# Patient Record
Sex: FEMALE | Race: WHITE | NOT HISPANIC OR LATINO | Employment: FULL TIME | URBAN - METROPOLITAN AREA
[De-identification: names, ages, dates, MRNs, and addresses within clinical notes are randomized per-mention and may not be internally consistent; named-entity substitution may affect disease eponyms.]

---

## 2017-12-15 ENCOUNTER — HOSPITAL ENCOUNTER (OUTPATIENT)
Dept: FAMILY MEDICINE CLINIC | Facility: CLINIC | Age: 52
Setting detail: SPECIMEN
Discharge: HOME | End: 2017-12-15
Attending: FAMILY MEDICINE | Admitting: FAMILY MEDICINE

## 2017-12-15 LAB
ALBUMIN SERPL-MCNC: 4.1 G/DL (ref 3.5–4.8)
ALBUMIN/GLOB SERPL: 1.5 {RATIO} (ref 1–1.7)
ALP SERPL-CCNC: 56 IU/L (ref 32–91)
ALT SERPL-CCNC: 41 IU/L (ref 14–54)
ANION GAP SERPL CALC-SCNC: 9.7 MMOL/L (ref 10–20)
AST SERPL-CCNC: 25 IU/L (ref 15–41)
BILIRUB SERPL-MCNC: 0.6 MG/DL (ref 0.3–1.2)
BUN SERPL-MCNC: 10 MG/DL (ref 8–20)
BUN/CREAT SERPL: 14.3 (ref 5.4–26.2)
CALCIUM SERPL-MCNC: 9.5 MG/DL (ref 8.9–10.3)
CHLORIDE SERPL-SCNC: 103 MMOL/L (ref 101–111)
CHOLEST SERPL-MCNC: 248 MG/DL
CHOLEST/HDLC SERPL: 6.9 {RATIO}
CONV CO2: 29 MMOL/L (ref 22–32)
CONV LDL CHOLESTEROL DIRECT: 156 MG/DL (ref 0–100)
CONV TOTAL PROTEIN: 6.9 G/DL (ref 6.1–7.9)
CREAT UR-MCNC: 0.7 MG/DL (ref 0.4–1)
GLOBULIN UR ELPH-MCNC: 2.8 G/DL (ref 2.5–3.8)
GLUCOSE SERPL-MCNC: 93 MG/DL (ref 65–99)
HDLC SERPL-MCNC: 36 MG/DL
LDLC/HDLC SERPL: 4.3 {RATIO}
LIPID INTERPRETATION: ABNORMAL
POTASSIUM SERPL-SCNC: 4.7 MMOL/L (ref 3.6–5.1)
SODIUM SERPL-SCNC: 137 MMOL/L (ref 136–144)
TRIGL SERPL-MCNC: 272 MG/DL
TSH SERPL-ACNC: 0.64 UIU/ML (ref 0.34–5.6)
VLDLC SERPL CALC-MCNC: 56 MG/DL

## 2019-02-21 ENCOUNTER — HOSPITAL ENCOUNTER (OUTPATIENT)
Dept: MAMMOGRAPHY | Facility: HOSPITAL | Age: 54
Discharge: HOME | End: 2019-02-21
Attending: FAMILY MEDICINE | Admitting: FAMILY MEDICINE

## 2019-03-20 ENCOUNTER — HOSPITAL ENCOUNTER (OUTPATIENT)
Dept: FAMILY MEDICINE CLINIC | Facility: CLINIC | Age: 54
Setting detail: SPECIMEN
Discharge: HOME | End: 2019-03-20
Attending: FAMILY MEDICINE | Admitting: FAMILY MEDICINE

## 2019-09-03 RX ORDER — ZOLPIDEM TARTRATE 5 MG/1
TABLET ORAL
Qty: 30 TABLET | Refills: 5 | Status: SHIPPED | OUTPATIENT
Start: 2019-09-03 | End: 2019-12-11 | Stop reason: DRUGHIGH

## 2019-11-11 ENCOUNTER — OFFICE VISIT (OUTPATIENT)
Dept: FAMILY MEDICINE CLINIC | Facility: CLINIC | Age: 54
End: 2019-11-11

## 2019-11-11 VITALS
SYSTOLIC BLOOD PRESSURE: 152 MMHG | DIASTOLIC BLOOD PRESSURE: 94 MMHG | HEART RATE: 95 BPM | HEIGHT: 65 IN | BODY MASS INDEX: 34.16 KG/M2 | OXYGEN SATURATION: 96 % | WEIGHT: 205 LBS

## 2019-11-11 DIAGNOSIS — G25.81 RESTLESS LEG: ICD-10-CM

## 2019-11-11 DIAGNOSIS — M25.512 ACUTE PAIN OF BOTH SHOULDERS: ICD-10-CM

## 2019-11-11 DIAGNOSIS — M25.511 ACUTE PAIN OF BOTH SHOULDERS: ICD-10-CM

## 2019-11-11 DIAGNOSIS — H92.22 EAR DISCHARGE BLOOD, LEFT: ICD-10-CM

## 2019-11-11 DIAGNOSIS — I10 ESSENTIAL HYPERTENSION: Primary | ICD-10-CM

## 2019-11-11 PROBLEM — Z01.89 OTHER SPECIFIED EXAMINATION: Status: ACTIVE | Noted: 2018-01-23

## 2019-11-11 PROBLEM — Z87.892 PERSONAL HISTORY OF ANAPHYLAXIS: Status: ACTIVE | Noted: 2019-02-18

## 2019-11-11 PROBLEM — E78.5 HYPERLIPIDEMIA: Status: ACTIVE | Noted: 2019-11-11

## 2019-11-11 PROBLEM — M67.90 DISORDER OF TENDON: Status: ACTIVE | Noted: 2019-04-05

## 2019-11-11 PROBLEM — F41.8 MIXED ANXIETY DEPRESSIVE DISORDER: Status: ACTIVE | Noted: 2017-12-15

## 2019-11-11 PROBLEM — F17.200 TOBACCO DEPENDENCE SYNDROME: Status: ACTIVE | Noted: 2017-12-15

## 2019-11-11 PROCEDURE — 99214 OFFICE O/P EST MOD 30 MIN: CPT | Performed by: NURSE PRACTITIONER

## 2019-11-11 RX ORDER — EPINEPHRINE 0.3 MG/.3ML
INJECTION SUBCUTANEOUS
COMMUNITY
Start: 2019-02-18 | End: 2020-04-28

## 2019-11-11 RX ORDER — MELOXICAM 15 MG/1
15 TABLET ORAL DAILY
COMMUNITY
End: 2020-03-11

## 2019-11-11 RX ORDER — LISINOPRIL 10 MG/1
10 TABLET ORAL DAILY
Qty: 30 TABLET | Refills: 0 | Status: SHIPPED | OUTPATIENT
Start: 2019-11-11 | End: 2019-12-03 | Stop reason: SDUPTHER

## 2019-11-11 RX ORDER — TIZANIDINE 2 MG/1
2 TABLET ORAL NIGHTLY PRN
Qty: 30 TABLET | Refills: 0 | Status: SHIPPED | OUTPATIENT
Start: 2019-11-11 | End: 2019-12-03 | Stop reason: SDUPTHER

## 2019-11-11 RX ORDER — CITALOPRAM 10 MG/1
10 TABLET ORAL DAILY
Refills: 3 | COMMUNITY
Start: 2019-10-30 | End: 2020-06-02

## 2019-11-11 NOTE — PATIENT INSTRUCTIONS
Start taking lisinopril daily for blood pressure  Decrease sodium intake  Continue taking mobic for joint pain  Muscle relaxer as needed at night  Physical therapy ordered  Work on exercises given in office  Bring copy of lab work this week   F/u 1 month

## 2019-11-11 NOTE — PROGRESS NOTES
Subjective   Elodia Alicea is a 54 y.o. female.     Pt is here today wit c/o HTN, ear drainage, shoulder pain, and restless leg.      HTN- Pt reports that her BP has been elevated on and off for years. Never has taken any medication.  Denies HA or chest pain.  Has SOA, but she smokes cigarettes.  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  She states that her cholesterol was high.  She cannot remember which other labs they orville.    Left ear drainage- Pt reports that she had some bloody drainage on Friday morning when she cleaned her ear with a qtip. She has not had any more drainage.  Denies any pain.  She has some hearing loss, but this is not new.  Had the nurse at work look at it and it was normal.     Shoulder pain- Bilateral shoulder pain for the last 3 months.  Has been taking mobic for inflammation, which helps some. She has also been using CBD cream, which helps some.  Denies any known injury.  She has increased her activity level and has has increased building projects this year.  She states that they ache and certain movements worsen her pain.  Raising her arms above her head it painful.  Pain is constant but the intensity changes with certain movements.     Leg shaking at night- Pt reports that she has been on ambien for sleeping.  She is only on 5 mg and has been trying to add supplement sleeping aides.  She has taken nyquil.  She states that her partner states that her legs are constantly moving during the night.  Her symptoms just started about 1 month ago. She is not waking frequently through the night or complaining of pain.  She has been using CBD oil at night for shoulder pain, which is also helping her sleep at night.         The following portions of the patient's history were reviewed and updated as appropriate: allergies, current medications, past family history, past medical history, past social history, past surgical history and  "problem list.    Review of Systems   Constitutional: Negative for chills, fatigue and fever.   HENT: Negative for congestion, ear pain and sinus pressure.    Eyes: Negative for blurred vision and double vision.   Respiratory: Negative for cough, chest tightness and shortness of breath.    Cardiovascular: Negative for chest pain, palpitations and leg swelling.   Musculoskeletal: Positive for arthralgias.   Neurological: Negative for dizziness and headache.       Objective   /94 (BP Location: Left arm, Patient Position: Sitting, Cuff Size: Large Adult)   Pulse 95   Ht 165.1 cm (65\")   Wt 93 kg (205 lb)   SpO2 96%   BMI 34.11 kg/m²   Physical Exam   Constitutional: She is oriented to person, place, and time. She appears well-developed and well-nourished. No distress.   HENT:   Head: Normocephalic and atraumatic.   Right Ear: Hearing, tympanic membrane, external ear and ear canal normal.   Left Ear: Hearing, tympanic membrane, external ear and ear canal normal.   Eyes: Conjunctivae and EOM are normal. Pupils are equal, round, and reactive to light. Right eye exhibits no discharge. Left eye exhibits no discharge.   Neck: Normal range of motion. Neck supple.   Cardiovascular: Normal rate and regular rhythm.   Pulmonary/Chest: Effort normal and breath sounds normal. No respiratory distress. She has no wheezes. She has no rales.   Abdominal: Soft. Normal appearance and bowel sounds are normal. She exhibits no distension. There is no tenderness.   Musculoskeletal: Normal range of motion.   Normal ROM with some mild discomfort in oscar shoulders.  No creputis or bony abnormalities   Neurological: She is alert and oriented to person, place, and time.   Skin: Skin is warm and dry. She is not diaphoretic.   Psychiatric: She has a normal mood and affect. Her behavior is normal. Thought content normal.   Vitals reviewed.        Assessment/Plan     Diagnoses and all orders for this visit:    1. Essential hypertension " (Primary)  Comments:  Start lisinopril 10 mg  Follow-up 1 month  Decrease sodium intake  Orders:  -     lisinopril (PRINIVIL,ZESTRIL) 10 MG tablet; Take 1 tablet by mouth Daily.  Dispense: 30 tablet; Refill: 0    2. Acute pain of both shoulders  Comments:  Possible arthritis versus rotator cuff injury  Exercises given  Mobic ordered  PT ordered  Orders:  -     Ambulatory Referral to Physical Therapy Evaluate and treat  -     tiZANidine (ZANAFLEX) 2 MG tablet; Take 1 tablet by mouth At Night As Needed for Muscle Spasms.  Dispense: 30 tablet; Refill: 0    3. Ear discharge blood, left  Comments:  No abnormalities noted  Resolved    4. Restless leg  Comments:  Could be side effect of medication  No pain  If no improvement will need a sleep study

## 2019-11-19 ENCOUNTER — TELEPHONE (OUTPATIENT)
Dept: FAMILY MEDICINE CLINIC | Facility: CLINIC | Age: 54
End: 2019-11-19

## 2019-11-19 RX ORDER — ATORVASTATIN CALCIUM 20 MG/1
20 TABLET, FILM COATED ORAL DAILY
Qty: 30 TABLET | Refills: 2 | Status: SHIPPED | OUTPATIENT
Start: 2019-11-19 | End: 2020-03-18 | Stop reason: SDUPTHER

## 2019-11-19 NOTE — TELEPHONE ENCOUNTER
Pt brought in labs that she had completed at her work wellness fair.  A1C 5.3 which is normal range. Glucose is 102.   Total cholesterol- 258  LDL- 185  HDL- 38  Triglycerides- 257    Her LDL and total cholesterol are quite elevated.  We need to start a statin on her. I will submit 20mg lipitor.  Increase exercise and work on eating a well-balanced diet.  Limit white foods (pasta, bread, rice, potatoes), saturated fats, sugary drinks, and dark meats.      ASCVD 10 year risk 16.2%

## 2019-11-20 ENCOUNTER — HOSPITAL ENCOUNTER (OUTPATIENT)
Dept: PHYSICAL THERAPY | Facility: HOSPITAL | Age: 54
Setting detail: THERAPIES SERIES
Discharge: HOME | End: 2019-11-20

## 2019-11-20 DIAGNOSIS — M25.511 BILATERAL SHOULDER PAIN, UNSPECIFIED CHRONICITY: Primary | ICD-10-CM

## 2019-11-20 DIAGNOSIS — M25.512 BILATERAL SHOULDER PAIN, UNSPECIFIED CHRONICITY: Primary | ICD-10-CM

## 2019-11-20 PROCEDURE — 97162 PT EVAL MOD COMPLEX 30 MIN: CPT | Performed by: PHYSICAL THERAPIST

## 2019-11-21 NOTE — THERAPY EVALUATION
"    Outpatient Physical Therapy Ortho Initial Evaluation  [facility]     Patient Name: Elodia Alicea  : 1965  MRN: [id number]  Today's Date: 2019      Visit Date: 2019    Patient Active Problem List   Diagnosis   • Disorder of tendon   • Other specified examination   • Encounter for screening for malignant neoplasm of cervix   • Encounter for general adult medical examination without abnormal findings   • Encounter for screening for malignant neoplasm of colon   • Hearing loss   • Hyperlipidemia   • Hypertension   • Hyperthyroidism   • Mixed anxiety depressive disorder   • Obesity   • Other screening mammogram   • Tobacco dependence syndrome   • Personal history of anaphylaxis        No past medical history on file.     No past surgical history on file.    Visit Dx:     ICD-10-CM ICD-9-CM   1. Bilateral shoulder pain, unspecified chronicity M25.511 719.41    M25.512          Patient History     Row Name 19 1400             History    Chief Complaint  Pain  -RA      Type of Pain  Shoulder pain  -RA      Date Current Problem(s) Began  19 MD referral, pain started about 4 months ago  -RA      Brief Description of Current Complaint  States she's a very physical person and \"pulled\" both of her shoulders about 4 months ago.  R bothers her more than the L.  Pain doesn't stop her from doing things.  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  PCP  -RA      How has patient tried to help current problem?  Meloxicam, CBD cream  -RA      What clinical tests have you had for this problem?  -- none to date  -RA         Pain     Pain " Location  Shoulder  -RA      Pain at Present  2  -RA      Pain at Best  2  -RA      Pain at Worst  5  -RA      Pain Frequency  Constant/continuous  -RA      Pain Description  Aching;Pressure  -RA      What Performance Factors Make the Current Problem(s) WORSE?  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  pillows for positioning seems to help  -RA      Is your sleep disturbed?  Yes  -RA      Is medication used to assist with sleep?  Yes Ambien  -RA      Total hours of sleep per night  3-4 hours before waking  -RA      What position do you sleep in?  Right sidelying;Left sidelying  -RA      Difficulties at work?  has pain but doesn't keep her from doing job  -RA      Difficulties with ADL's?  taking a bra off, carrying laundry up/down stairs  -RA      Difficulties with recreational activities?  pain but able to do   -RA         Fall Risk Assessment    Any falls in the past year:  No  -RA         Daily Activities    Primary Language  English  -RA      Are you able to read  Yes  -RA      Are you able to write  Yes  -RA      How does patient learn best?  Listening  -RA      Teaching needs identified  Home Exercise Program;Management of Condition  -RA      Patient is concerned about/has problems with  Grasping objects lifting;Performing home management (household chores, shopping, care of dependents);Performing job responsibilities/community activities (work, school,;Performing sports, recreation, and play activities;Reaching over head;Repetitive movements of the hand, arm, shoulder  -RA      Barriers to learning  Hearing  -RA      Action taken for identified issues  looking at patient and speaking louder when talking to patient  -RA      Explanation of Functional Status Problem  Independent with pain and/or modification - has  backed off on some things  -RA      Pt Participated in POC and Goals  Yes  -RA         Safety    Are you being hurt, hit, or frightened by anyone at home or in your life?  No  -RA      Are you being neglected by a caregiver  No  -RA        User Key  (r) = Recorded By, (t) = Taken By, (c) = Cosigned By    Initials Name Provider Type    Fabiana Deras, PT Physical Therapist          PT Ortho     Row Name 11/20/19 1500       Posture/Observations    Posture/Observations Comments  FH/rounded shoulder posture, L shoulder higher than R  -RA       Quarter Clearing    Quarter Clearing  Upper Quarter Clearing  -RA       Myotomal Screen- Upper Quarter Clearing    Shoulder flexion (C5)  4 (Good) gives with pain   -RA    Elbow flexion/wrist extension (C6)  Bilateral:;4+ (Good +)  -RA    Elbow extension/wrist flexion (C7)  Bilateral:;4+ (Good +)  -RA       Cervical/Shoulder ROM Screen    Cervical flexion  Normal  -RA    Cervical extension  Normal  -RA    Cervical lateral flexion  Impaired 50-75%, mild discofort B  -RA    Cervical rotation  Normal mild opp shoulder discomfort   -RA       Special Tests/Palpation    Special Tests/Palpation  Shoulder  -RA       Shoulder Girdle Accessory Motions    Shoulder Girdle Accessory Motions Tested?  Yes  -RA       Shoulder Impingement/Rotator Cuff Special Tests    William-Alexandre Test (RC Lesion vs. Bursitis)  Bilateral:;Negative  -RA    Neer Impingement Test (RC Lesion vs. Bursitis)  Negative  -RA    Full Can Test (RC Lesion)  Negative  -RA    Empty Can Test (RC Lesion)  Positive;Bilateral:  -RA       Shoulder Girdle Palpation    Shoulder Girdle Palpation?  Yes tender B shoulder superior/posterior GH joint  -RA       General ROM    GENERAL ROM COMMENTS  B shoulder AROM WFL and nearly symmetric  -RA       MMT Right Upper Ext    Rt Shoulder ABduction MMT, Gross Movement  (4/5) good  -RA    Rt Shoulder Internal Rotation MMT, Gross Movement  (4/5) good  -RA    Rt Shoulder External Rotation  MMT, Gross Movement  (4-/5) good minus  -RA       MMT Left Upper Ext    Lt Shoulder ABduction MMT, Gross Movement  (4-/5) good minus pain limiting  -RA    Lt Shoulder Internal Rotation MMT, Gross Movement  (4/5) good  -RA    Lt Shoulder External Rotation MMT, Gross Movement  (4-/5) good minus  -RA       Sensation    Light Touch  No apparent deficits  -RA       Flexibility    Flexibility Tested?  Upper Extremity  -RA       Upper Extremity Flexibility    UE Flexibility Comments  tightness of anterior chest tissues  -RA      User Key  (r) = Recorded By, (t) = Taken By, (c) = Cosigned By    Initials Name Provider Type    Fabiana Deras, PT Physical Therapist                      Therapy Education  Given: HEP, Symptoms/condition management, Posture/body mechanics  Program: New  How Provided: Verbal, Demonstration, Written  Provided to: Patient  Level of Understanding: Teach back education performed, Verbalized     PT OP Goals     Row Name 11/20/19 1800          PT Short Term Goals    STG 1  Patient will be independent with initial HEP for posture and ROM/flexibility without increased symptoms  -RA     STG 2  Patient will be educated on and demonstrate knowledge of optimal posture and shoulder positioning to minimize strain on affected tissues with daily activities  -RA     STG 3  Patient will report >/= 25% decreased pain with daily activities  -RA        Long Term Goals    LTG 1  Patient will be independent with established HEP for strength/stabilization to facilitate self management of symptoms/condition.  -RA     LTG 2  Patient will have postural/shoulder girdle strength >/= 4+/5 throughout for improved stability with desired activities  -RA     LTG 3  Patient will be able to sleep >/= 5-6 hours without interruption 2/2 pain for improvement in restorative sleep  -RA     LTG 4  Patient will report pain shoulder pain </= 2/10 with work tasks/leisure activities  -RA     LTG 5  Patient will improve QuickDash score  from 17.5 to </= 10 indicating reduction in perceived functional disability  -RA        Time Calculation    PT Goal Re-Cert Due Date  11/20/19  -RA       User Key  (r) = Recorded By, (t) = Taken By, (c) = Cosigned By    Initials Name Provider Type    RA Fabiana Kolb, PT Physical Therapist          PT Assessment/Plan     Row Name 11/20/19 3849          PT Assessment    Functional Limitations  Performance in work activities;Limitations in community activities;Limitation in home management  -RA     Impairments  Posture;Pain;Muscle strength;Impaired flexibility  -RA     Assessment Comments Patient is a 55yo R handed F referred to therapy for evolving condition of R>L shoulder pain.   She states pain is constant but she is still able to do what she wants with increased pain and it limits her ability to sleep at night.  She has comorbidities/personal factors including thyroid issues, hearing loss, HTN mixed anxiety/depressive disorder and physically demanding work duties / hobbies.  She demonstrates decreased postural awareness, functional shoulder ROM B with mild pain/discomfort, shoulder strength deficits with MMT (? pain limiting), tightness of anterior chest tissues, and tenderness with palpation over sup/post GH joint B.   She may benefit from skilled PT for education, ROM/flexibility, strength/stabilization, and manual/modalities prn to help reduce pain, improve functional activity tolerance, and facilitate independent self-management of symptoms/condition.   -RA     Please refer to paper survey for additional self-reported information  Yes  -RA     Rehab Potential  Good  -RA     Patient/caregiver participated in establishment of treatment plan and goals  Yes  -RA     Patient would benefit from skilled therapy intervention  Yes  -RA        PT Plan    PT Frequency  2x/week  -RA     Predicted Duration of Therapy Intervention (Therapy Eval)  30-90 days   -RA     Planned CPT's?  PT EVAL MOD COMPLELITY: 13117;PT THER  PROC EA 15 MIN: 72573;PT NEUROMUSC RE-EDUCATION EA 15 MIN: 43833;PT MANUAL THERAPY EA 15 MIN: 61201;PT HOT OR COLD PACK TREAT MCARE;PT SELF CARE/HOME MGMT/TRAIN EA 15: 05124  -RA     PT Plan Comments  Skilled PT for education, pain reduction, and postural/shoulder girdle strengthening to facilitate return to prior function.  -RA       User Key  (r) = Recorded By, (t) = Taken By, (c) = Cosigned By    Initials Name Provider Type    Fabiana Deras PT Physical Therapist            OP Exercises     Row Name 11/20/19 1800             Total Minutes    50230 - PT Therapeutic Exercise Minutes  15  -RA         Exercise 1    Exercise Name 1  Instructed in initial HEP including doorway pec stretch (upside down V position), scap ret/dep, TB rows, TB shoulder extension, and SL ER.  -RA        User Key  (r) = Recorded By, (t) = Taken By, (c) = Cosigned By    Initials Name Provider Type    Fabiana Deras PT Physical Therapist                        Outcome Measure Options: Other Outcome Measure  Other Outcome Measure Tool Used  Other Outcome Measure Tool Comments: QuickDash (one ? omitted) - score = 17.5      Time Calculation:     Start Time: 1449  Stop Time: 1540  Time Calculation (min): 51 min     Therapy Charges for Today     Code Description Service Date Service Provider Modifiers Qty    27652915677  PT EVAL MOD COMPLEXITY 2 11/20/2019 Fabiana Kolb, PT GP 1          PT G-Codes  Outcome Measure Options: Other Outcome Measure         Fabiana Kolb PT  11/21/2019

## 2019-12-03 DIAGNOSIS — M25.512 ACUTE PAIN OF BOTH SHOULDERS: ICD-10-CM

## 2019-12-03 DIAGNOSIS — M25.511 ACUTE PAIN OF BOTH SHOULDERS: ICD-10-CM

## 2019-12-03 DIAGNOSIS — I10 ESSENTIAL HYPERTENSION: ICD-10-CM

## 2019-12-03 RX ORDER — TIZANIDINE 2 MG/1
2 TABLET ORAL NIGHTLY PRN
Qty: 30 TABLET | Refills: 0 | Status: SHIPPED | OUTPATIENT
Start: 2019-12-03 | End: 2020-01-03

## 2019-12-03 RX ORDER — LISINOPRIL 10 MG/1
TABLET ORAL
Qty: 90 TABLET | Refills: 0 | Status: SHIPPED | OUTPATIENT
Start: 2019-12-03 | End: 2019-12-11 | Stop reason: DRUGHIGH

## 2019-12-06 ENCOUNTER — HOSPITAL ENCOUNTER (OUTPATIENT)
Dept: PHYSICAL THERAPY | Facility: HOSPITAL | Age: 54
Setting detail: THERAPIES SERIES
Discharge: HOME | End: 2019-12-06

## 2019-12-06 DIAGNOSIS — M25.511 BILATERAL SHOULDER PAIN, UNSPECIFIED CHRONICITY: Primary | ICD-10-CM

## 2019-12-06 DIAGNOSIS — M25.512 BILATERAL SHOULDER PAIN, UNSPECIFIED CHRONICITY: Primary | ICD-10-CM

## 2019-12-06 PROCEDURE — 97110 THERAPEUTIC EXERCISES: CPT

## 2019-12-06 NOTE — THERAPY TREATMENT NOTE
Outpatient Physical Therapy Ortho Treatment Note  [facility]     Patient Name: Elodia Alicea  : 1965  MRN: [id number]  Today's Date: 2019      Visit Date: 2019    Visit Dx:    ICD-10-CM ICD-9-CM   1. Bilateral shoulder pain, unspecified chronicity M25.511 719.41    M25.512        Patient Active Problem List   Diagnosis   • Disorder of tendon   • Other specified examination   • Encounter for screening for malignant neoplasm of cervix   • Encounter for general adult medical examination without abnormal findings   • Encounter for screening for malignant neoplasm of colon   • Hearing loss   • Hyperlipidemia   • Hypertension   • Hyperthyroidism   • Mixed anxiety depressive disorder   • Obesity   • Other screening mammogram   • Tobacco dependence syndrome   • Personal history of anaphylaxis        No past medical history on file.     No past surgical history on file.                    PT Assessment/Plan     Row Name 19 1454          PT Assessment    Assessment Comments  oscar trigger point UT. Continue to have forward posture. Added weight to S/L ER. Using ice at home. Education on posture  -       User Key  (r) = Recorded By, (t) = Taken By, (c) = Cosigned By    Initials Name Provider Type    Mahendra Walker PTA Physical Therapy Assistant            OP Exercises     Row Name 19 1430             Subjective Comments    Subjective Comments  doing exercises 3-4 x a day  -         Subjective Pain    Subjective Pain Comment  sleep on side increased pain  -         Total Minutes    60448 - PT Therapeutic Exercise Minutes  25  -WS         Exercise 1    Exercise Name 1  scap ret RTB  -WS      Reps 1  15  -WS         Exercise 2    Exercise Name 2  shoulder ext RTB  -WS      Reps 2  15  -WS         Exercise 3    Exercise Name 3  HA RTB  -WS      Reps 3  15  -WS         Exercise 4    Exercise Name 4  UBE 3min  -         Exercise 5    Exercise Name 5  S/L ER  -WS      Reps 5  2  -WS       Time 5  10  -WS      Additional Comments  1#  -WS         Exercise 6    Exercise Name 6  add high row to promote posture  -         Exercise 7    Exercise Name 7  low doorway stretch  -WS      Reps 7  3  -WS      Time 7  20  -WS        User Key  (r) = Recorded By, (t) = Taken By, (c) = Cosigned By    Initials Name Provider Type    Mahendra Walker PTA Physical Therapy Assistant                       PT OP Goals     Row Name 12/06/19 1400          PT Short Term Goals    STG 1  Patient will be independent with initial HEP for posture and ROM/flexibility without increased symptoms  -WS     STG 1 Progress  Ongoing  -WS     STG 2  Patient will be educated on and demonstrate knowledge of optimal posture and shoulder positioning to minimize strain on affected tissues with daily activities  -WS     STG 2 Progress  Ongoing  -WS     STG 3  Patient will report >/= 25% decreased pain with daily activities  -WS     STG 3 Progress  Ongoing  -WS        Long Term Goals    LTG 1  Patient will be independent with established HEP for strength/stabilization to facilitate self management of symptoms/condition.  -WS     LTG 2  Patient will have postural/shoulder girdle strength >/= 4+/5 throughout for improved stability with desired activities  -WS     LTG 3  Patient will be able to sleep >/= 5-6 hours without interruption 2/2 pain for improvement in restorative sleep  -WS     LTG 4  Patient will report pain shoulder pain </= 2/10 with work tasks/leisure activities  -WS     LTG 5  Patient will improve QuickDash score from  to </= indicating reduction in perceived functional disability  -       User Key  (r) = Recorded By, (t) = Taken By, (c) = Cosigned By    Initials Name Provider Type    Mahendra Walker PTA Physical Therapy Assistant          Therapy Education  Given: HEP, Symptoms/condition management, Pain management  Program: Reinforced  How Provided: Verbal  Provided to: Patient              Time Calculation:    Start Time: 1430  Stop Time: 1455  Time Calculation (min): 25 min  Therapy Charges for Today     Code Description Service Date Service Provider Modifiers Qty    [id number] HC PT THER PROC EA 15 MIN 12/6/2019 Mahendra David, PTA GP 2                    Mahendra David PTA  12/6/2019

## 2019-12-11 ENCOUNTER — APPOINTMENT (OUTPATIENT)
Dept: PHYSICAL THERAPY | Facility: HOSPITAL | Age: 54
End: 2019-12-11

## 2019-12-11 ENCOUNTER — OFFICE VISIT (OUTPATIENT)
Dept: FAMILY MEDICINE CLINIC | Facility: CLINIC | Age: 54
End: 2019-12-11

## 2019-12-11 VITALS
BODY MASS INDEX: 33.79 KG/M2 | TEMPERATURE: 97.6 F | HEART RATE: 88 BPM | WEIGHT: 202.8 LBS | OXYGEN SATURATION: 98 % | SYSTOLIC BLOOD PRESSURE: 142 MMHG | DIASTOLIC BLOOD PRESSURE: 82 MMHG | HEIGHT: 65 IN

## 2019-12-11 DIAGNOSIS — R09.89 CHOKING SENSATION: ICD-10-CM

## 2019-12-11 DIAGNOSIS — R13.10 DYSPHAGIA, UNSPECIFIED TYPE: ICD-10-CM

## 2019-12-11 DIAGNOSIS — I10 ESSENTIAL HYPERTENSION: Primary | ICD-10-CM

## 2019-12-11 DIAGNOSIS — E78.2 MIXED HYPERLIPIDEMIA: ICD-10-CM

## 2019-12-11 DIAGNOSIS — G47.00 INSOMNIA, UNSPECIFIED TYPE: ICD-10-CM

## 2019-12-11 PROCEDURE — 99214 OFFICE O/P EST MOD 30 MIN: CPT | Performed by: FAMILY MEDICINE

## 2019-12-11 RX ORDER — LISINOPRIL 20 MG/1
20 TABLET ORAL DAILY
Qty: 90 TABLET | Refills: 3 | Status: SHIPPED | OUTPATIENT
Start: 2019-12-11 | End: 2020-09-15

## 2019-12-11 RX ORDER — ZOLPIDEM TARTRATE 10 MG/1
10 TABLET ORAL NIGHTLY PRN
Qty: 90 TABLET | Refills: 1 | Status: SHIPPED | OUTPATIENT
Start: 2019-12-11 | End: 2020-06-09

## 2019-12-11 NOTE — PROGRESS NOTES
Subjective   Elodia Alicea is a 54 y.o. female.     Comes in for follow-up after she was started on lisinopril 10 mg at her last visit a month ago  Still smoking  One cup of coffee a day  Started rehab for shoulders  Has not started chol med sec to concerns about side effects  Choking episodes - rarely related to eating or drinking  Can occur when sitting on the couch  Will awaken her from sleep  She is not aware of gerd  Does not feel congested but wonders if she is having trouble with her saliva  Wants her dose of ambien increased  She did well on the 10mg and slept better with no residual effect in the morning and no unusual activities       The following portions of the patient's history were reviewed and updated as appropriate: allergies, current medications, past family history, past medical history, past social history, past surgical history and problem list.  Past Medical History:   Diagnosis Date   • Anxiety    • Hyperlipidemia    • Hypertension      Past Surgical History:   Procedure Laterality Date   • BREAST SURGERY     •  SECTION       Family History   Problem Relation Age of Onset   • Diabetes Mother    • Hypertension Mother    • Diabetes Father    • Hypertension Father    • Hyperlipidemia Father      Social History     Socioeconomic History   • Marital status:      Spouse name: Not on file   • Number of children: Not on file   • Years of education: Not on file   • Highest education level: Not on file   Tobacco Use   • Smoking status: Current Every Day Smoker     Packs/day: 0.25     Types: Cigarettes   • Smokeless tobacco: Former User   Substance and Sexual Activity   • Alcohol use: Yes     Comment: once every six month    • Drug use: Never         Current Outpatient Medications:   •  atorvastatin (LIPITOR) 20 MG tablet, Take 1 tablet by mouth Daily., Disp: 30 tablet, Rfl: 2  •  citalopram (CeleXA) 10 MG tablet, Take 10 mg by mouth Daily., Disp: , Rfl: 3  •  EPINEPHrine (EPIPEN 2-JOSE)  "0.3 MG/0.3ML solution auto-injector injection, EPIPEN 2-JOSE 0.3 MG/0.3ML SOAJ, Disp: , Rfl:   •  meloxicam (MOBIC) 15 MG tablet, Take 15 mg by mouth Daily., Disp: , Rfl:   •  tiZANidine (ZANAFLEX) 2 MG tablet, TAKE 1 TABLET BY MOUTH AT NIGHT AS NEEDED FOR MUSCLE SPASMS., Disp: 30 tablet, Rfl: 0  •  lisinopril (PRINIVIL,ZESTRIL) 20 MG tablet, Take 1 tablet by mouth Daily., Disp: 90 tablet, Rfl: 3  •  zolpidem (AMBIEN) 10 MG tablet, Take 1 tablet by mouth At Night As Needed for Sleep., Disp: 90 tablet, Rfl: 1    Review of Systems   Constitutional: Negative for diaphoresis, fatigue, fever, unexpected weight gain and unexpected weight loss.   HENT: Positive for trouble swallowing.    Respiratory: Positive for choking. Negative for cough, chest tightness and shortness of breath.    Cardiovascular: Negative for chest pain, palpitations and leg swelling.   Gastrointestinal: Negative for nausea, vomiting and GERD.   Musculoskeletal: Positive for arthralgias.   Neurological: Negative for dizziness, syncope and headache.   Psychiatric/Behavioral: Positive for sleep disturbance.     /82   Pulse 88   Temp 97.6 °F (36.4 °C) (Oral)   Ht 165.1 cm (65\")   Wt 92 kg (202 lb 12.8 oz)   SpO2 98%   BMI 33.75 kg/m²       Objective   Physical Exam   Constitutional: She appears well-developed and well-nourished. No distress. She is obese.  HENT:   Head: Normocephalic and atraumatic.   Right Ear: Tympanic membrane normal.   Left Ear: Tympanic membrane normal.   Mouth/Throat: Uvula is midline, oropharynx is clear and moist and mucous membranes are normal.   Neck: Trachea normal. Neck supple. No JVD present. No thyromegaly present.   Cardiovascular: Normal rate, regular rhythm, normal heart sounds and intact distal pulses. Exam reveals no gallop and no friction rub.   No murmur heard.  Pulmonary/Chest: Effort normal and breath sounds normal. No respiratory distress. She has no wheezes. She has no rales.   Musculoskeletal: She " exhibits no edema.   Lymphadenopathy:     She has no cervical adenopathy.   Neurological: She is alert.   Skin: Skin is warm and dry.   Psychiatric: She has a normal mood and affect.   Nursing note and vitals reviewed.        Assessment/Plan   Problems Addressed this Visit        Cardiovascular and Mediastinum    Hyperlipidemia    Hypertension - Primary    Relevant Medications    lisinopril (PRINIVIL,ZESTRIL) 20 MG tablet      Other Visit Diagnoses     Insomnia, unspecified type        Relevant Medications    zolpidem (AMBIEN) 10 MG tablet    Dysphagia, unspecified type        Relevant Orders    FL Video Swallow With Speech    Choking sensation        Relevant Orders    FL Video Swallow With Speech          Still do not feel her blood pressure is adequately controlled so I will increase her lisinopril to 20 mg  She was counseled on the importance of lifestyle modification including weight loss and smoking cessation  She was encouraged to start taking her atorvastatin  I will increase her Ambien to 10 mg  Her choking sensation is little unusual with several possibilities; I will go ahead and send her for a video swallow evaluation with speech  I will see her back in a few months

## 2019-12-11 NOTE — PATIENT INSTRUCTIONS
Keep working to lose weight through healthy eating and exercise.  Keep working to stop smoking  Take 2 of the lsinopril 10 mg until you run out then start the new rx for 20mg  Start the atorvastatin (chol medication)

## 2019-12-13 ENCOUNTER — HOSPITAL ENCOUNTER (OUTPATIENT)
Dept: PHYSICAL THERAPY | Facility: HOSPITAL | Age: 54
Setting detail: THERAPIES SERIES
Discharge: HOME | End: 2019-12-13

## 2019-12-13 DIAGNOSIS — M25.511 BILATERAL SHOULDER PAIN, UNSPECIFIED CHRONICITY: Primary | ICD-10-CM

## 2019-12-13 DIAGNOSIS — M25.512 BILATERAL SHOULDER PAIN, UNSPECIFIED CHRONICITY: Primary | ICD-10-CM

## 2019-12-13 PROCEDURE — 97110 THERAPEUTIC EXERCISES: CPT | Performed by: PHYSICAL THERAPIST

## 2019-12-13 PROCEDURE — 97140 MANUAL THERAPY 1/> REGIONS: CPT | Performed by: PHYSICAL THERAPIST

## 2019-12-13 NOTE — THERAPY TREATMENT NOTE
Outpatient Physical Therapy Ortho Treatment Note  [facility]     Patient Name: Elodia Alicea  : 1965  MRN: [id number]  Today's Date: 2019      Visit Date: 2019    Visit Dx:    ICD-10-CM ICD-9-CM   1. Bilateral shoulder pain, unspecified chronicity M25.511 719.41    M25.512        Patient Active Problem List   Diagnosis   • Disorder of tendon   • Other specified examination   • Encounter for screening for malignant neoplasm of cervix   • Encounter for general adult medical examination without abnormal findings   • Encounter for screening for malignant neoplasm of colon   • Hearing loss   • Hyperlipidemia   • Hypertension   • Hyperthyroidism   • Mixed anxiety depressive disorder   • Obesity   • Other screening mammogram   • Tobacco dependence syndrome   • Personal history of anaphylaxis        Past Medical History:   Diagnosis Date   • Anxiety    • Hyperlipidemia    • Hypertension         Past Surgical History:   Procedure Laterality Date   • BREAST SURGERY     •  SECTION                         PT Assessment/Plan     Row Name 19 1230          PT Assessment    Assessment Comments  Patient reports L arm is feeling weaker and she is dropping items. Long discussion on lack of shoulder or neck imaging to date. Suggested assessment with ortho MD (suggestions given) due to high pain levels, lack of tolerance to daily activities, and concerns of L arm strength for possible cervical involvement. Manual therapy techniques to thoracic and cervical spine. General hypomobility of thoracic spine noted.   -KEVEN        PT Plan    PT Plan Comments  continue shoulder girdle strengthening as tolerated.  -KEVEN       User Key  (r) = Recorded By, (t) = Taken By, (c) = Cosigned By    Initials Name Provider Type    Handy Tamayo, PT Physical Therapist            OP Exercises     Row Name 19 1200 19 1100          Subjective Comments    Subjective Comments  My pain is pretty awful. The L  shoulder is worse than the R. I am trying to sleep on my back. I cannot say I have noticed any improvements with the exercises to date  -CJ  --        Subjective Pain    Able to rate subjective pain?  yes  -CJ  --     Pre-Treatment Pain Level  7  -CJ  --     Post-Treatment Pain Level  6  -CJ  --        Total Minutes    58074 - PT Therapeutic Exercise Minutes  20  -CJ  --     35389 - PT Manual Therapy Minutes  --  25  -CJ        Exercise 1    Exercise Name 1  scap ret RTB  -CJ  --     Reps 1  15  -CJ  --        Exercise 2    Exercise Name 2  shoulder ext RTB  -CJ  --     Reps 2  15  -CJ  --        Exercise 4    Exercise Name 4  UBE 3min  -CJ  --        Exercise 5    Exercise Name 5  S/L ER  -CJ  --     Reps 5  2  -CJ  --     Time 5  10  -CJ  --     Additional Comments  #1  -CJ  --        Exercise 6    Exercise Name 6  Lat pulls, RTB  -CJ  --     Reps 6  15  -CJ  --       User Key  (r) = Recorded By, (t) = Taken By, (c) = Cosigned By    Initials Name Provider Type    Handy Tamayo, PT Physical Therapist                      Manual Rx (last 36 hours)      Manual Treatments     Row Name 12/13/19 1100             Total Minutes    40533 - PT Manual Therapy Minutes  25  -CJ         Manual Rx 1    Manual Rx 1 Location  Patient prone for DTM/MFR to B scapular musculature, B upper traps, B levator  -      Manual Rx 1 Type  Then Supine for passive stretching to B upper traps, levators, and STM to B deltoids and pectoral musculature  -      Manual Rx 1 Grade  Prone thoracic PAs  -        User Key  (r) = Recorded By, (t) = Taken By, (c) = Cosigned By    Initials Name Provider Type    Handy Tamayo PT Physical Therapist                             Time Calculation:   Start Time: 1000  Stop Time: 1045  Time Calculation (min): 45 min  Total Timed Code Minutes- PT: 45 minute(s)  Therapy Charges for Today     Code Description Service Date Service Provider Modifiers Qty    64554497679  PT THER PROC EA 15 MIN  12/13/2019 Handy Rossi, PT GP 1    [id number]  PT MANUAL THERAPY EA 15 MIN 12/13/2019 Handy Rossi, PT GP 2                    Handy Rossi, PT  12/13/2019

## 2019-12-16 ENCOUNTER — HOSPITAL ENCOUNTER (OUTPATIENT)
Dept: GENERAL RADIOLOGY | Facility: HOSPITAL | Age: 54
Discharge: HOME | End: 2019-12-16
Admitting: FAMILY MEDICINE

## 2019-12-16 DIAGNOSIS — R09.89 CHOKING SENSATION: ICD-10-CM

## 2019-12-16 DIAGNOSIS — R13.10 DYSPHAGIA, UNSPECIFIED TYPE: ICD-10-CM

## 2019-12-16 PROCEDURE — 92611 MOTION FLUOROSCOPY/SWALLOW: CPT

## 2019-12-16 PROCEDURE — 74230 X-RAY XM SWLNG FUNCJ C+: CPT

## 2019-12-16 RX ADMIN — BARIUM SULFATE 50 ML: 400 SUSPENSION ORAL at 10:45

## 2019-12-16 RX ADMIN — BARIUM SULFATE 700 MG: 700 TABLET ORAL at 10:45

## 2019-12-16 NOTE — MBS/VFSS/FEES
"Outpatient Speech Language Pathology   Adult Swallow Initial Evaluation    [facility]     Patient Name: Elodia Alicea  : 1965  MRN: [id number]  Today's Date: 2019         Visit Date: 2019   Patient Active Problem List   Diagnosis   • Disorder of tendon   • Other specified examination   • Encounter for screening for malignant neoplasm of cervix   • Encounter for general adult medical examination without abnormal findings   • Encounter for screening for malignant neoplasm of colon   • Hearing loss   • Hyperlipidemia   • Hypertension   • Hyperthyroidism   • Mixed anxiety depressive disorder   • Obesity   • Other screening mammogram   • Tobacco dependence syndrome   • Personal history of anaphylaxis        Past Medical History:   Diagnosis Date   • Anxiety    • Hyperlipidemia    • Hypertension         Past Surgical History:   Procedure Laterality Date   • BREAST SURGERY     •  SECTION           Visit Dx:     ICD-10-CM ICD-9-CM   1. Dysphagia, unspecified type R13.10 787.20   2. Choking sensation R09.89 784.99           SLP Adult Swallow Evaluation     Row Name 19 1000       Rehab Evaluation    Document Type  evaluation  -SM    Subjective Information  no complaints  -SM    Patient Observations  alert;cooperative;agree to therapy  -SM    Patient Effort  excellent  -SM    Comment  The patient was seen today for a video swallow study. She is alert and cooperative and served as her own informant. She reports a history of difficulty swallowing for approximately 1 year however has worsened recently. She states that this rarely occurs when she is eating. She describes difficulty \"swallowing saliva\" in that it will \"go to the wrong area\" and this will occur during the night or while watching TV in the absence of food or liquid. She describes this as an \"aggressive choking\", not just a \"cough\". She denies having reflux or reflux symptoms. She coompleted the Reflux Symptom Index and scored a 12 on " this (a score of >13 possibly indicative of significant reflux disease). She also completed the EAT-10 with a score of 0, indicating minimal to no impact on daily function or quality of life.   -SM       General Information    Pertinent History Of Current Problem  The patient is a 54 year old female who presents today for a Video Swallow Study. Information obtained from recent MD appointment includes:    Recent prescription for Lisinopril 10 mg at her last MD   Still smoking  One cup of coffee a day  Started rehab for shoulders  Has not started chol med sec to concerns about side effects  Choking episodes - rarely related to eating or drinking  Can occur when sitting on the couch  Will awaken her from sleep  She is not aware of GERD  Does not feel congested but wonders if she is having trouble with her saliva  Wants her dose of ambien increased  She did well on the 10mg and slept better with no residual effect in the morning and no unusual activities    (Portions of this note obtained/copied from MD note)    -SM    Current Method of Nutrition  regular textures;thin liquids  -SM    Precautions/Limitations, Vision  WFL with corrective lenses  -SM    Precautions/Limitations, Hearing  WFL  -SM    Prior Level of Function-Communication  WFL  -SM    Prior Level of Function-Swallowing  no diet consistency restrictions;regular textures;thin liquids Pt reports a nut and seed allergy, including pine nuts, poppy seeds, sesame seeds and other seeds/nuts  -SM       Oral Motor and Function    Oral Lesions or Structural Abnormalities and/or variants  None  -SM    Dentition Assessment  natural, present and adequate  -SM    Secretion Management  WNL/WFL  -SM    Mucosal Quality  moist, healthy  -SM    Volitional Swallow  WFL  -SM    Volitional Cough  WFL  -SM       Oral Musculature and Cranial Nerve Assessment    Oral Motor, Comment  No overt weakness or asymmetry noted. The patient is verbal with excellent intelligibility. Adequate  "vocal volume and pitch noted. No hoarse or \"wet\" vocal quality noted.   -SM       General Eating/Swallowing Observations    Respiratory Support Currently in Use  room air  -    Eating/Swallowing Skills  self-fed  -    Positioning During Eating  -- Standing in lateral view  -       MBS/VFSS    Utensils Used  cup;spoon  (pt prefers to drink liquids by cup, no straw)-    Consistencies Trialed  thin liquids;pureed;soft textures;regular textures  -       MBS/VFSS Interpretation    Oral Prep Phase  WFL  -    VFSS Summary  The patient was assessed standing and viewed in the lateral projection for this procedure. She was assessed consuming thin liquid contrast by cup, pureed (applesauce), mech soft (peaches, regular solid (cracker coated with barium paste) and a barium tablet taken with water.    THIN LIQUID: The patient was assessed consuming 2-3 trials of thin liquid contrast by cup. Labial seal is adequate with no anterior spillage noted. Oral transit/control WFL. Slight premature spillage of the bolus evident prior to swallow initiation. The bolus reaches the level of the pyriform sinus prior to swallow initiation. Upon swallowing, adequate anterior hyoid excursion, laryngeal elevation, laryngeal vestibular closure and complete epiglottic inversion noted. No penetration or aspiration noted. Mild vallecular/pyriform sinus residue noted after the swallow, however she does make a spontaneous dry swallow which is effective in clearing all residue.     PUREED: The patient was then assessed consuming 2 trials of applesauce mixed with barium. She self fed all trials. Labial seal is adequate. No anterior spillage noted. Oral transit and bolus control WFL. Approximately a 3 second swallow delay is noted with spillage to the valleculae prior to swallow initiation. Upon initiation of the pharyngeal swallow adequate anterior hyoid excursion, laryngeal elevation, laryngeal vestibular closure and complete epiglottic " inversion noted. No penetration or aspiration noted.She clears the oral and pharyngeal cavities well with no significant residue evident.     MECHANICAL SOFT: The patient was then assessed with 2 trials of peaches coated with barium. She self fed both trials. She presents with functional and efficient tongue motion and bolus control. Complete recollection of the bolus noted. Premature spillage of the fruit juice noted to the level of the pyriform sinus with a swallow delay ranging from 2-7 seconds. Upon initiation of the pharyngeal swallow, adequate anterior hyoid excursion, laryngeal elevation, laryngeal vestibular closure and complete epiglottic inversion noted. No penetration or aspiration noted. She clears the oral and pharyngeal cavities well with no significant residue evident.     REGULAR SOLID: The patient was then assessed with a cracker coated with Barium Paste. She is able to bite this with no difficulties. Timely and efficient chewing/mastication noted. Adequate bolus formation noted with the patient able to propel the bolus posteriorly effectively. Swallow initiation is timely. Upon initiation of the pharyngeal swallow, adequate anterior hyoid excursion, laryngeal elevation, laryngeal vestibular closure and complete epiglottic inversion noted. Mild base of tongue and vallecular residue noted after the swallow however she does make a spontaneous dry swallow and clears this effectively. No penetration or aspiration noted.  -SM       Initiation of Pharyngeal Swallow    Initiation of Pharyngeal Swallow  bolus in valleculae;bolus in pyriform sinuses  -SM    Pharyngeal Phase  functional pharyngeal phase of swallowing  -SM    Pharyngeal Phase, Comment  Slight premature spillage of thin liquids noted with premature spillage to the level of the pyriform sinus prior to swallow initiation.   -SM       Esophageal Phase    Esophageal Phase  no impairments  -SM    Esophageal Phase, Comment  An esophageal scan was  completed with thin and pureed consistencies. The esophagus clears readily with no significant retention, regurgitation or other difficulties. She was given a barium tablet and able to take this with water. Slight hesitation noted with the pill residing in the valleculae for a few seconds, however this did clear with no other difficulties. This was noted to pass readily through the esophagus and entered the stomach with no further difficultlies..   -SM       Clinical Impression    SLP Swallowing Diagnosis  functional oral phase;functional pharyngeal phase  -    Functional Impact  no impact on function  -    Swallow Criteria for Skilled Therapeutic Interventions Met  baseline status  -       Recommendations    SLP Diet Recommendation It is recommended that the patient continue a  regular textures;thin liquids. She should be up at a 90 degree angle for all meals and remain up for at least 30 minutes following to insure clearance of any possible residue. She may benefit from following reflux precautions as her complaints appear to be more esophageal in nature. If symptoms persist she was encouraged to contact her PCP for further medical management and intervention as indicated.    -    Recommended Precautions and Strategies  upright posture during/after eating;multiple swallows per bite of food;multiple swallows per sip of liquid;alternate between small bites of food and sips of liquid  -      User Key  (r) = Recorded By, (t) = Taken By, (c) = Cosigned By    Initials Name Provider Type    Irasema Woody, SLP Speech and Language Pathologist        Thank you for referring this pleasant patient to our department and please contact us if you have any other questions.         Time Calculation:                     KARTHIK Marion  12/16/2019

## 2019-12-18 ENCOUNTER — HOSPITAL ENCOUNTER (OUTPATIENT)
Dept: PHYSICAL THERAPY | Facility: HOSPITAL | Age: 54
Setting detail: THERAPIES SERIES
Discharge: HOME | End: 2019-12-18

## 2019-12-18 DIAGNOSIS — M25.511 BILATERAL SHOULDER PAIN, UNSPECIFIED CHRONICITY: Primary | ICD-10-CM

## 2019-12-18 DIAGNOSIS — M25.512 BILATERAL SHOULDER PAIN, UNSPECIFIED CHRONICITY: Primary | ICD-10-CM

## 2019-12-18 PROCEDURE — 97110 THERAPEUTIC EXERCISES: CPT

## 2019-12-18 PROCEDURE — 97140 MANUAL THERAPY 1/> REGIONS: CPT

## 2019-12-18 NOTE — THERAPY TREATMENT NOTE
Outpatient Physical Therapy Ortho Treatment Note  [facility]     Patient Name: Elodia Alicea  : 1965  MRN: [id number]  Today's Date: 2019      Visit Date: 2019    Visit Dx:    ICD-10-CM ICD-9-CM   1. Bilateral shoulder pain, unspecified chronicity M25.511 719.41    M25.512        Patient Active Problem List   Diagnosis   • Disorder of tendon   • Other specified examination   • Encounter for screening for malignant neoplasm of cervix   • Encounter for general adult medical examination without abnormal findings   • Encounter for screening for malignant neoplasm of colon   • Hearing loss   • Hyperlipidemia   • Hypertension   • Hyperthyroidism   • Mixed anxiety depressive disorder   • Obesity   • Other screening mammogram   • Tobacco dependence syndrome   • Personal history of anaphylaxis        Past Medical History:   Diagnosis Date   • Anxiety    • Hyperlipidemia    • Hypertension         Past Surgical History:   Procedure Laterality Date   • BREAST SURGERY     •  SECTION                         PT Assessment/Plan     Row Name 19 1339          PT Assessment    Assessment Comments  Patient reporting decreased pain, not dropping items as often. Scheduled ortho consult  -       User Key  (r) = Recorded By, (t) = Taken By, (c) = Cosigned By    Initials Name Provider Type    Mahendra Walker PTA Physical Therapy Assistant            OP Exercises     Row Name 19 1300             Subjective Comments    Subjective Comments  shoulder pain decreased. ice helps alot  -WS         Subjective Pain    Able to rate subjective pain?  yes  -WS      Pre-Treatment Pain Level  2  -WS      Subjective Pain Comment  ortho consult set up  -         Total Minutes    84654 - PT Therapeutic Exercise Minutes  20  -WS      17731 - PT Manual Therapy Minutes  25  -WS         Exercise 1    Exercise Name 1  scap ret RTB  -WS      Reps 1  15  -WS      Additional Comments  high and low  -WS          Exercise 2    Exercise Name 2  shoulder ext RTB  -WS      Reps 2  15  -WS         Exercise 4    Exercise Name 4  UBE 4 min  -WS         Exercise 5    Exercise Name 5  S/L ER  -WS      Reps 5  2  -WS      Time 5  10  -WS      Additional Comments  2#  -WS         Exercise 6    Exercise Name 6  Lat pulls, RTB  -WS      Reps 6  15  -WS        User Key  (r) = Recorded By, (t) = Taken By, (c) = Cosigned By    Initials Name Provider Type    Mahendra Walker PTA Physical Therapy Assistant                      Manual Rx (last 36 hours)      Manual Treatments     Row Name 12/18/19 1300             Total Minutes    48499 - PT Manual Therapy Minutes  25  -WS         Manual Rx 1    Manual Rx 1 Location  Patient prone for DTM/MFR to B scapular musculature, B upper traps, B levator  -      Manual Rx 1 Type  Then Supine for passive stretching to B upper traps, levators, and STM to B deltoids and pectoral musculature  -      Manual Rx 1 Grade  Prone thoracic PAs  -WS        User Key  (r) = Recorded By, (t) = Taken By, (c) = Cosigned By    Initials Name Provider Type    Mahendra Walker PTA Physical Therapy Assistant          PT OP Goals     Row Name 12/18/19 1300          PT Short Term Goals    STG 1  Patient will be independent with initial HEP for posture and ROM/flexibility without increased symptoms  -     STG 1 Progress  Ongoing  -     STG 2  Patient will be educated on and demonstrate knowledge of optimal posture and shoulder positioning to minimize strain on affected tissues with daily activities  -     STG 2 Progress  Ongoing  -     STG 3  Patient will report >/= 25% decreased pain with daily activities  -     STG 3 Progress  Ongoing  -        Long Term Goals    LTG 1  Patient will be independent with established HEP for strength/stabilization to facilitate self management of symptoms/condition.  -     LTG 2  Patient will have postural/shoulder girdle strength >/= 4+/5 throughout for improved  stability with desired activities  -     LTG 3  Patient will be able to sleep >/= 5-6 hours without interruption 2/2 pain for improvement in restorative sleep  -     LTG 4  Patient will report pain shoulder pain </= 2/10 with work tasks/leisure activities  -     LTG 5  Patient will improve QuickDash score from  to </= indicating reduction in perceived functional disability  -       User Key  (r) = Recorded By, (t) = Taken By, (c) = Cosigned By    Initials Name Provider Type    Mahendra Walker PTA Physical Therapy Assistant          Therapy Education  Given: HEP, Symptoms/condition management, Posture/body mechanics  Program: Reinforced  How Provided: Verbal  Provided to: Patient              Time Calculation:   Start Time: 1300  Stop Time: 1345  Time Calculation (min): 45 min  Therapy Charges for Today     Code Description Service Date Service Provider Modifiers Qty    90303716233 HC PT THER PROC EA 15 MIN 12/18/2019 Mahendra David PTA GP 1    74362927260 HC PT MANUAL THERAPY EA 15 MIN 12/18/2019 Mahendra David PTA GP 2                    Mahendra David PTA  12/18/2019

## 2019-12-23 ENCOUNTER — HOSPITAL ENCOUNTER (OUTPATIENT)
Dept: PHYSICAL THERAPY | Facility: HOSPITAL | Age: 54
Setting detail: THERAPIES SERIES
Discharge: HOME | End: 2019-12-23

## 2019-12-23 DIAGNOSIS — M25.511 BILATERAL SHOULDER PAIN, UNSPECIFIED CHRONICITY: Primary | ICD-10-CM

## 2019-12-23 DIAGNOSIS — M25.512 BILATERAL SHOULDER PAIN, UNSPECIFIED CHRONICITY: Primary | ICD-10-CM

## 2019-12-23 PROCEDURE — 97140 MANUAL THERAPY 1/> REGIONS: CPT

## 2019-12-23 PROCEDURE — 97110 THERAPEUTIC EXERCISES: CPT

## 2019-12-23 NOTE — THERAPY TREATMENT NOTE
Outpatient Physical Therapy Ortho Treatment Note  [facility]     Patient Name: Elodia Alicea  : 1965  MRN: [id number]  Today's Date: 2019      Visit Date: 2019    Visit Dx:    ICD-10-CM ICD-9-CM   1. Bilateral shoulder pain, unspecified chronicity M25.511 719.41    M25.512        Patient Active Problem List   Diagnosis   • Disorder of tendon   • Other specified examination   • Encounter for screening for malignant neoplasm of cervix   • Encounter for general adult medical examination without abnormal findings   • Encounter for screening for malignant neoplasm of colon   • Hearing loss   • Hyperlipidemia   • Hypertension   • Hyperthyroidism   • Mixed anxiety depressive disorder   • Obesity   • Other screening mammogram   • Tobacco dependence syndrome   • Personal history of anaphylaxis        Past Medical History:   Diagnosis Date   • Anxiety    • Hyperlipidemia    • Hypertension         Past Surgical History:   Procedure Laterality Date   • BREAST SURGERY     •  SECTION                         PT Assessment/Plan     Row Name 19 1328          PT Assessment    Assessment Comments  Decreased oscar shoulder pain. Continue to need cuing for posture  -WS       User Key  (r) = Recorded By, (t) = Taken By, (c) = Cosigned By    Initials Name Provider Type    Mahendra Walker PTA Physical Therapy Assistant            OP Exercises     Row Name 19 1300             Subjective Comments    Subjective Comments  shoulders are feeling bettter  -         Subjective Pain    Able to rate subjective pain?  yes  -WS      Pre-Treatment Pain Level  1  -WS         Total Minutes    32345 - PT Therapeutic Exercise Minutes  25  -WS      97442 - PT Manual Therapy Minutes  15  -WS         Exercise 1    Exercise Name 1  scap ret BTB  -WS      Reps 1  15  -WS      Additional Comments  high and low  -WS         Exercise 2    Exercise Name 2  shoulder ext BTB  -WS      Reps 2  15  -WS          Exercise 4    Exercise Name 4  UBE 4 min  -WS         Exercise 5    Exercise Name 5  S/L ER  -WS      Reps 5  2  -WS      Time 5  10  -WS      Additional Comments  2#  -WS         Exercise 6    Exercise Name 6  Lat pulls, BTB  -WS      Reps 6  15  -WS        User Key  (r) = Recorded By, (t) = Taken By, (c) = Cosigned By    Initials Name Provider Type    Mahendra Walker PTA Physical Therapy Assistant                      Manual Rx (last 36 hours)      Manual Treatments     Row Name 12/23/19 1300             Total Minutes    37468 - PT Manual Therapy Minutes  15  -WS         Manual Rx 1    Manual Rx 1 Location  Patient prone for DTM/MFR to B scapular musculature, B upper traps, B levator  -WS      Manual Rx 1 Type  Then Supine for passive stretching to B upper traps, levators, and STM to B deltoids and pectoral musculature  -WS      Manual Rx 1 Grade  Prone thoracic PAs  -WS        User Key  (r) = Recorded By, (t) = Taken By, (c) = Cosigned By    Initials Name Provider Type    Mahendra Walker PTA Physical Therapy Assistant          PT OP Goals     Row Name 12/23/19 1300          PT Short Term Goals    STG 1  Patient will be independent with initial HEP for posture and ROM/flexibility without increased symptoms  -WS     STG 1 Progress  Ongoing  -     STG 2  Patient will be educated on and demonstrate knowledge of optimal posture and shoulder positioning to minimize strain on affected tissues with daily activities  -     STG 2 Progress  Ongoing  -     STG 3  Patient will report >/= 25% decreased pain with daily activities  -     STG 3 Progress  Ongoing  -        Long Term Goals    LTG 1  Patient will be independent with established HEP for strength/stabilization to facilitate self management of symptoms/condition.  -     LTG 2  Patient will have postural/shoulder girdle strength >/= 4+/5 throughout for improved stability with desired activities  -     LTG 3  Patient will be able to sleep >/=  5-6 hours without interruption 2/2 pain for improvement in restorative sleep  -     LTG 4  Patient will report pain shoulder pain </= 2/10 with work tasks/leisure activities  -     LTG 5  Patient will improve QuickDash score from  to </= indicating reduction in perceived functional disability  -       User Key  (r) = Recorded By, (t) = Taken By, (c) = Cosigned By    Initials Name Provider Type    Mahendra Walker PTA Physical Therapy Assistant          Therapy Education  Given: HEP, Symptoms/condition management, Posture/body mechanics  Program: Reinforced  How Provided: Verbal  Provided to: Patient              Time Calculation:   Start Time: 1300  Stop Time: 1340  Time Calculation (min): 40 min  Therapy Charges for Today     Code Description Service Date Service Provider Modifiers Qty    16445682198 HC PT THER PROC EA 15 MIN 12/23/2019 Mahendra David PTA GP 2    [id number]  PT MANUAL THERAPY EA 15 MIN 12/23/2019 Mahendra David PTA GP 1                    Mahendra David PTA  12/23/2019

## 2019-12-26 ENCOUNTER — APPOINTMENT (OUTPATIENT)
Dept: PHYSICAL THERAPY | Facility: HOSPITAL | Age: 54
End: 2019-12-26

## 2019-12-30 ENCOUNTER — HOSPITAL ENCOUNTER (OUTPATIENT)
Dept: PHYSICAL THERAPY | Facility: HOSPITAL | Age: 54
Setting detail: THERAPIES SERIES
Discharge: HOME | End: 2019-12-30

## 2019-12-30 DIAGNOSIS — M25.512 BILATERAL SHOULDER PAIN, UNSPECIFIED CHRONICITY: Primary | ICD-10-CM

## 2019-12-30 DIAGNOSIS — M25.511 BILATERAL SHOULDER PAIN, UNSPECIFIED CHRONICITY: Primary | ICD-10-CM

## 2019-12-30 PROCEDURE — 97110 THERAPEUTIC EXERCISES: CPT

## 2019-12-30 PROCEDURE — 97140 MANUAL THERAPY 1/> REGIONS: CPT

## 2019-12-30 NOTE — THERAPY TREATMENT NOTE
Outpatient Physical Therapy Ortho Treatment Note  [facility]     Patient Name: Elodia Alicea  : 1965  MRN: [id number]  Today's Date: 2019      Visit Date: 2019    Visit Dx:    ICD-10-CM ICD-9-CM   1. Bilateral shoulder pain, unspecified chronicity M25.511 719.41    M25.512        Patient Active Problem List   Diagnosis   • Disorder of tendon   • Other specified examination   • Encounter for screening for malignant neoplasm of cervix   • Encounter for general adult medical examination without abnormal findings   • Encounter for screening for malignant neoplasm of colon   • Hearing loss   • Hyperlipidemia   • Hypertension   • Hyperthyroidism   • Mixed anxiety depressive disorder   • Obesity   • Other screening mammogram   • Tobacco dependence syndrome   • Personal history of anaphylaxis        Past Medical History:   Diagnosis Date   • Anxiety    • Hyperlipidemia    • Hypertension         Past Surgical History:   Procedure Laterality Date   • BREAST SURGERY     •  SECTION                         PT Assessment/Plan     Row Name 19 1324          PT Assessment    Assessment Comments  Pt had small flare up left shoulder from pulling up covers. Using ice at home twice a day. Continue to have shoulder forward posture.   -WS       User Key  (r) = Recorded By, (t) = Taken By, (c) = Cosigned By    Initials Name Provider Type    WS Mahendra David PTA Physical Therapy Assistant            OP Exercises     Row Name 19 1300             Subjective Comments    Subjective Comments  increased left shoulder pain from pulling cover up  -WS         Subjective Pain    Able to rate subjective pain?  yes  -WS      Pre-Treatment Pain Level  2  -WS         Total Minutes    38829 - PT Therapeutic Exercise Minutes  25  -WS      84732 - PT Manual Therapy Minutes  15  -WS         Exercise 1    Exercise Name 1  scap ret BTB  -WS      Sets 1  2  -WS      Reps 1  10  -WS      Additional Comments   high and low  -WS         Exercise 2    Exercise Name 2  shoulder ext BTB  -WS      Sets 2  2  -WS      Reps 2  10  -WS         Exercise 4    Exercise Name 4  UBE 4 min  -WS         Exercise 5    Exercise Name 5  S/L ER  -WS      Reps 5  2  -WS      Time 5  10  -WS      Additional Comments  2#  -WS         Exercise 6    Exercise Name 6  Lat pulls, BTB  -WS      Sets 6  2  -WS      Reps 6  10  -WS         Exercise 7    Exercise Name 7  add shrugs  -WS        User Key  (r) = Recorded By, (t) = Taken By, (c) = Cosigned By    Initials Name Provider Type    Mahendra Walker PTA Physical Therapy Assistant                      Manual Rx (last 36 hours)      Manual Treatments     Row Name 12/30/19 1300             Total Minutes    29478 - PT Manual Therapy Minutes  15  -WS         Manual Rx 1    Manual Rx 1 Location  Patient prone for DTM/MFR to B scapular musculature, B upper traps, B levator  -WS      Manual Rx 1 Type  Then Supine for passive stretching to B upper traps, levators, and STM to B deltoids and pectoral musculature  -WS      Manual Rx 1 Grade  Prone thoracic PAs  -WS        User Key  (r) = Recorded By, (t) = Taken By, (c) = Cosigned By    Initials Name Provider Type    Mahendra Walker PTA Physical Therapy Assistant          PT OP Goals     Row Name 12/30/19 1300          PT Short Term Goals    STG 1  Patient will be independent with initial HEP for posture and ROM/flexibility without increased symptoms  -WS     STG 1 Progress  Met  -WS     STG 2  Patient will be educated on and demonstrate knowledge of optimal posture and shoulder positioning to minimize strain on affected tissues with daily activities  -WS     STG 2 Progress  Ongoing  -WS     STG 3  Patient will report >/= 25% decreased pain with daily activities  -WS     STG 3 Progress  Met  -WS        Long Term Goals    LTG 1  Patient will be independent with established HEP for strength/stabilization to facilitate self management of  symptoms/condition.  -     LTG 2  Patient will have postural/shoulder girdle strength >/= 4+/5 throughout for improved stability with desired activities  -WS     LTG 3  Patient will be able to sleep >/= 5-6 hours without interruption 2/2 pain for improvement in restorative sleep  -WS     LTG 4  Patient will report pain shoulder pain </= 2/10 with work tasks/leisure activities  -     LTG 5  Patient will improve QuickDash score from  to </= indicating reduction in perceived functional disability  -       User Key  (r) = Recorded By, (t) = Taken By, (c) = Cosigned By    Initials Name Provider Type    Mahendra Walker PTA Physical Therapy Assistant          Therapy Education  Given: HEP, Symptoms/condition management, Pain management, Posture/body mechanics  Program: Reinforced  How Provided: Verbal  Provided to: Patient              Time Calculation:   Start Time: 1300  Stop Time: 1340  Time Calculation (min): 40 min  Therapy Charges for Today     Code Description Service Date Service Provider Modifiers Qty    67799356847 HC PT THER PROC EA 15 MIN 12/30/2019 Mahendra David PTA GP 2    62853090382 HC PT MANUAL THERAPY EA 15 MIN 12/30/2019 Mahendra David PTA GP 1                    Mahendra David PTA  12/30/2019

## 2020-01-02 ENCOUNTER — APPOINTMENT (OUTPATIENT)
Dept: PHYSICAL THERAPY | Facility: HOSPITAL | Age: 55
End: 2020-01-02

## 2020-01-03 DIAGNOSIS — M25.511 ACUTE PAIN OF BOTH SHOULDERS: ICD-10-CM

## 2020-01-03 DIAGNOSIS — M25.512 ACUTE PAIN OF BOTH SHOULDERS: ICD-10-CM

## 2020-01-03 RX ORDER — TIZANIDINE 2 MG/1
2 TABLET ORAL NIGHTLY PRN
Qty: 30 TABLET | Refills: 0 | Status: SHIPPED | OUTPATIENT
Start: 2020-01-03 | End: 2020-01-29

## 2020-01-06 ENCOUNTER — HOSPITAL ENCOUNTER (OUTPATIENT)
Dept: PHYSICAL THERAPY | Facility: HOSPITAL | Age: 55
Setting detail: THERAPIES SERIES
Discharge: HOME | End: 2020-01-06

## 2020-01-06 DIAGNOSIS — M25.512 BILATERAL SHOULDER PAIN, UNSPECIFIED CHRONICITY: Primary | ICD-10-CM

## 2020-01-06 DIAGNOSIS — M25.511 BILATERAL SHOULDER PAIN, UNSPECIFIED CHRONICITY: Primary | ICD-10-CM

## 2020-01-06 PROCEDURE — 97140 MANUAL THERAPY 1/> REGIONS: CPT

## 2020-01-06 PROCEDURE — 97110 THERAPEUTIC EXERCISES: CPT

## 2020-01-06 NOTE — THERAPY TREATMENT NOTE
Outpatient Physical Therapy Ortho Treatment Note  [facility]     Patient Name: Elodia Alicea  : 1965  MRN: [id number]  Today's Date: 2020      Visit Date: 2020    Visit Dx:    ICD-10-CM ICD-9-CM   1. Bilateral shoulder pain, unspecified chronicity M25.511 719.41    M25.512        Patient Active Problem List   Diagnosis   • Disorder of tendon   • Other specified examination   • Encounter for screening for malignant neoplasm of cervix   • Encounter for general adult medical examination without abnormal findings   • Encounter for screening for malignant neoplasm of colon   • Hearing loss   • Hyperlipidemia   • Hypertension   • Hyperthyroidism   • Mixed anxiety depressive disorder   • Obesity   • Other screening mammogram   • Tobacco dependence syndrome   • Personal history of anaphylaxis        Past Medical History:   Diagnosis Date   • Anxiety    • Hyperlipidemia    • Hypertension         Past Surgical History:   Procedure Laterality Date   • BREAST SURGERY     •  SECTION                         PT Assessment/Plan     Row Name 20 1330          PT Assessment    Assessment Comments  Discussed importance of performing HEP and proper posure. Increased weight with S/L ER. Patient had a lttle diffculty with left  -       User Key  (r) = Recorded By, (t) = Taken By, (c) = Cosigned By    Initials Name Provider Type    Mahendra Walker PTA Physical Therapy Assistant            OP Exercises     Row Name 20 1300             Subjective Comments    Subjective Comments  had left shoulder pain last night. Not really doing home program.   -WS         Subjective Pain    Able to rate subjective pain?  yes  -WS      Pre-Treatment Pain Level  1  -      Subjective Pain Comment  ortho consult this month  -         Total Minutes    33565 - PT Therapeutic Exercise Minutes  25  -WS      81986 - PT Manual Therapy Minutes  15  -WS         Exercise 1    Exercise Name 1  scap ret BTB  -WS       Sets 1  2  -WS      Reps 1  10  -WS      Additional Comments  high and low  -WS         Exercise 2    Exercise Name 2  shoulder ext BTB  -WS      Sets 2  2  -WS      Reps 2  10  -WS         Exercise 4    Exercise Name 4  UBE 4 min  -WS         Exercise 5    Exercise Name 5  S/L ER  -WS      Reps 5  2  -WS      Time 5  10  -WS      Additional Comments  3#  -WS         Exercise 6    Exercise Name 6  Lat pulls, BTB  -WS      Sets 6  2  -WS      Reps 6  10  -WS         Exercise 7    Exercise Name 7   shrugs  -WS      Sets 7  2  -WS      Reps 7  10  -WS      Additional Comments  3#  -WS        User Key  (r) = Recorded By, (t) = Taken By, (c) = Cosigned By    Initials Name Provider Type    Mahendra Walker PTA Physical Therapy Assistant                      Manual Rx (last 36 hours)      Manual Treatments     Row Name 01/06/20 1300             Total Minutes    61317 - PT Manual Therapy Minutes  15  -WS         Manual Rx 1    Manual Rx 1 Location  Patient prone for DTM/MFR to B scapular musculature, B upper traps, B levator  -WS      Manual Rx 1 Type  Then Supine for passive stretching to B upper traps, levators, and STM to B deltoids and pectoral musculature  -WS      Manual Rx 1 Grade  Prone thoracic PAs  -WS        User Key  (r) = Recorded By, (t) = Taken By, (c) = Cosigned By    Initials Name Provider Type    Mahendra Walker PTA Physical Therapy Assistant          PT OP Goals     Row Name 01/06/20 1300          PT Short Term Goals    STG 1  Patient will be independent with initial HEP for posture and ROM/flexibility without increased symptoms  -WS     STG 1 Progress  Met  -WS     STG 2  Patient will be educated on and demonstrate knowledge of optimal posture and shoulder positioning to minimize strain on affected tissues with daily activities  -WS     STG 2 Progress  Ongoing  -WS     STG 3  Patient will report >/= 25% decreased pain with daily activities  -WS     STG 3 Progress  Met  -WS        Long Term  Goals    LTG 1  Patient will be independent with established HEP for strength/stabilization to facilitate self management of symptoms/condition.  -     LTG 2  Patient will have postural/shoulder girdle strength >/= 4+/5 throughout for improved stability with desired activities  -WS     LTG 3  Patient will be able to sleep >/= 5-6 hours without interruption 2/2 pain for improvement in restorative sleep  -     LTG 4  Patient will report pain shoulder pain </= 2/10 with work tasks/leisure activities  -     LTG 5  Patient will improve QuickDash score from  to </= indicating reduction in perceived functional disability  -       User Key  (r) = Recorded By, (t) = Taken By, (c) = Cosigned By    Initials Name Provider Type    Mahendra Walker PTA Physical Therapy Assistant          Therapy Education  Given: HEP, Symptoms/condition management, Pain management, Posture/body mechanics  Program: Reinforced  How Provided: Verbal  Provided to: Patient              Time Calculation:   Start Time: 1300  Stop Time: 1345  Time Calculation (min): 45 min  Therapy Charges for Today     Code Description Service Date Service Provider Modifiers Qty    05510522204 HC PT THER PROC EA 15 MIN 1/6/2020 Mahendra David PTA GP 2    81800921226 HC PT MANUAL THERAPY EA 15 MIN 1/6/2020 Mahendra David PTA GP 1                    Mahendra David PTA  1/6/2020

## 2020-01-09 ENCOUNTER — APPOINTMENT (OUTPATIENT)
Dept: PHYSICAL THERAPY | Facility: HOSPITAL | Age: 55
End: 2020-01-09

## 2020-01-13 ENCOUNTER — HOSPITAL ENCOUNTER (OUTPATIENT)
Dept: PHYSICAL THERAPY | Facility: HOSPITAL | Age: 55
Setting detail: THERAPIES SERIES
Discharge: HOME | End: 2020-01-13

## 2020-01-13 DIAGNOSIS — M25.512 BILATERAL SHOULDER PAIN, UNSPECIFIED CHRONICITY: Primary | ICD-10-CM

## 2020-01-13 DIAGNOSIS — M25.511 BILATERAL SHOULDER PAIN, UNSPECIFIED CHRONICITY: Primary | ICD-10-CM

## 2020-01-13 PROCEDURE — 97110 THERAPEUTIC EXERCISES: CPT

## 2020-01-13 PROCEDURE — 97140 MANUAL THERAPY 1/> REGIONS: CPT

## 2020-01-13 NOTE — THERAPY TREATMENT NOTE
Outpatient Physical Therapy Ortho Treatment Note  [facility]     Patient Name: Elodia Alicea  : 1965  MRN: [id number]  Today's Date: 2020      Visit Date: 2020    Visit Dx:    ICD-10-CM ICD-9-CM   1. Bilateral shoulder pain, unspecified chronicity M25.511 719.41    M25.512        Patient Active Problem List   Diagnosis   • Disorder of tendon   • Other specified examination   • Encounter for screening for malignant neoplasm of cervix   • Encounter for general adult medical examination without abnormal findings   • Encounter for screening for malignant neoplasm of colon   • Hearing loss   • Hyperlipidemia   • Hypertension   • Hyperthyroidism   • Mixed anxiety depressive disorder   • Obesity   • Other screening mammogram   • Tobacco dependence syndrome   • Personal history of anaphylaxis        Past Medical History:   Diagnosis Date   • Anxiety    • Hyperlipidemia    • Hypertension         Past Surgical History:   Procedure Laterality Date   • BREAST SURGERY     •  SECTION                         PT Assessment/Plan     Row Name 20 1331          PT Assessment    Assessment Comments  Continue to discuss importance to perform HEP. Has 1 more visit. Ortho consult coming up  -WS       User Key  (r) = Recorded By, (t) = Taken By, (c) = Cosigned By    Initials Name Provider Type    Mahendra Walker PTA Physical Therapy Assistant            OP Exercises     Row Name 20 1300             Subjective Comments    Subjective Comments  been doing too much both shoulder hurt, doing some of the exercises  -WS         Subjective Pain    Able to rate subjective pain?  yes  -WS      Subjective Pain Comment  3/10 left, 1/10 right  -WS         Total Minutes    43591 - PT Therapeutic Exercise Minutes  25  -WS      05764 - PT Manual Therapy Minutes  15  -WS         Exercise 1    Exercise Name 1  scap ret BTB  -WS      Sets 1  2  -WS      Reps 1  10  -WS      Additional Comments  high and low   -WS         Exercise 2    Exercise Name 2  shoulder ext BTB  -WS      Sets 2  2  -WS      Reps 2  10  -WS         Exercise 4    Exercise Name 4  UBE 4 min  -WS         Exercise 5    Exercise Name 5  S/L ER  -WS      Reps 5  2  -WS      Time 5  10  -WS      Additional Comments  3#  -WS         Exercise 6    Exercise Name 6  Lat pulls, BTB  -WS      Sets 6  2  -WS      Reps 6  10  -WS         Exercise 7    Exercise Name 7   shrugs  -WS      Sets 7  2  -WS      Reps 7  10  -WS      Additional Comments  3#  -WS        User Key  (r) = Recorded By, (t) = Taken By, (c) = Cosigned By    Initials Name Provider Type    Mahendra Walker PTA Physical Therapy Assistant                      Manual Rx (last 36 hours)      Manual Treatments     Row Name 01/13/20 1300             Total Minutes    52351 - PT Manual Therapy Minutes  15  -WS         Manual Rx 1    Manual Rx 1 Location  Patient prone for DTM/MFR to B scapular musculature, B upper traps, B levator  -WS      Manual Rx 1 Type  Then Supine for passive stretching to B upper traps, levators, and STM to B deltoids and pectoral musculature  -WS      Manual Rx 1 Grade  Prone thoracic PAs  -WS        User Key  (r) = Recorded By, (t) = Taken By, (c) = Cosigned By    Initials Name Provider Type    Mahendra Walker PTA Physical Therapy Assistant          PT OP Goals     Row Name 01/13/20 1300          PT Short Term Goals    STG 1  Patient will be independent with initial HEP for posture and ROM/flexibility without increased symptoms  -WS     STG 1 Progress  Met  -WS     STG 2  Patient will be educated on and demonstrate knowledge of optimal posture and shoulder positioning to minimize strain on affected tissues with daily activities  -WS     STG 2 Progress  Ongoing  -WS     STG 3  Patient will report >/= 25% decreased pain with daily activities  -WS     STG 3 Progress  Met  -WS        Long Term Goals    LTG 1  Patient will be independent with established HEP for  strength/stabilization to facilitate self management of symptoms/condition.  -     LTG 1 Progress  Ongoing  -WS     LTG 2  Patient will have postural/shoulder girdle strength >/= 4+/5 throughout for improved stability with desired activities  -WS     LTG 2 Progress  Ongoing  -WS     LTG 3  Patient will be able to sleep >/= 5-6 hours without interruption 2/2 pain for improvement in restorative sleep  -WS     LTG 3 Progress  Ongoing  -WS     LTG 4  Patient will report pain shoulder pain </= 2/10 with work tasks/leisure activities  -WS     LTG 4 Progress  Ongoing  -WS     LTG 5  Patient will improve QuickDash score from  to </= indicating reduction in perceived functional disability  -       User Key  (r) = Recorded By, (t) = Taken By, (c) = Cosigned By    Initials Name Provider Type    Mahendra Walker PTA Physical Therapy Assistant          Therapy Education  Given: HEP, Pain management, Posture/body mechanics  Program: Reinforced  How Provided: Verbal  Provided to: Patient              Time Calculation:   Start Time: 1300  Stop Time: 1340  Time Calculation (min): 40 min  Therapy Charges for Today     Code Description Service Date Service Provider Modifiers Qty    35771012649 HC PT THER PROC EA 15 MIN 1/13/2020 Mahendra David PTA GP 2    63802011211 HC PT MANUAL THERAPY EA 15 MIN 1/13/2020 Mahendra David PTA GP 1                    Mahendra David PTA  1/13/2020

## 2020-01-20 ENCOUNTER — HOSPITAL ENCOUNTER (OUTPATIENT)
Dept: PHYSICAL THERAPY | Facility: HOSPITAL | Age: 55
Setting detail: THERAPIES SERIES
Discharge: HOME | End: 2020-01-20

## 2020-01-20 DIAGNOSIS — M25.511 BILATERAL SHOULDER PAIN, UNSPECIFIED CHRONICITY: Primary | ICD-10-CM

## 2020-01-20 DIAGNOSIS — M25.512 BILATERAL SHOULDER PAIN, UNSPECIFIED CHRONICITY: Primary | ICD-10-CM

## 2020-01-20 PROCEDURE — 97110 THERAPEUTIC EXERCISES: CPT

## 2020-01-20 PROCEDURE — 97140 MANUAL THERAPY 1/> REGIONS: CPT

## 2020-01-20 NOTE — THERAPY TREATMENT NOTE
Outpatient Physical Therapy Ortho Treatment Note  [facility]     Patient Name: Elodia Alicea  : 1965  MRN: [id number]  Today's Date: 2020      Visit Date: 2020    Visit Dx:    ICD-10-CM ICD-9-CM   1. Bilateral shoulder pain, unspecified chronicity M25.511 719.41    M25.512        Patient Active Problem List   Diagnosis   • Disorder of tendon   • Other specified examination   • Encounter for screening for malignant neoplasm of cervix   • Encounter for general adult medical examination without abnormal findings   • Encounter for screening for malignant neoplasm of colon   • Hearing loss   • Hyperlipidemia   • Hypertension   • Hyperthyroidism   • Mixed anxiety depressive disorder   • Obesity   • Other screening mammogram   • Tobacco dependence syndrome   • Personal history of anaphylaxis        Past Medical History:   Diagnosis Date   • Anxiety    • Hyperlipidemia    • Hypertension         Past Surgical History:   Procedure Laterality Date   • BREAST SURGERY     •  SECTION                         PT Assessment/Plan     Row Name 20 1321          PT Assessment    Assessment Comments  Pt to follow up with ortho next week. Discussed performing exercises 2-3 x a week. Patient discharged to home management  -WS       User Key  (r) = Recorded By, (t) = Taken By, (c) = Cosigned By    Initials Name Provider Type    WS Mahendra David PTA Physical Therapy Assistant            OP Exercises     Row Name 20 1300             Subjective Comments    Subjective Comments  decreased shoulder pain today  -WS         Subjective Pain    Able to rate subjective pain?  yes  -WS      Pre-Treatment Pain Level  1  -WS         Total Minutes    70934 - PT Therapeutic Exercise Minutes  25  -WS      62624 - PT Manual Therapy Minutes  15  -WS         Exercise 1    Exercise Name 1  scap ret BTB  -WS      Sets 1  2  -WS      Reps 1  10  -WS      Additional Comments  high and low  -WS         Exercise 2     Exercise Name 2  shoulder ext BTB  -WS      Sets 2  2  -WS      Reps 2  10  -WS         Exercise 4    Exercise Name 4  UBE 4 min  -WS         Exercise 5    Exercise Name 5  S/L ER  -WS      Reps 5  2  -WS      Time 5  10  -WS      Additional Comments  3#  -WS         Exercise 6    Exercise Name 6  Lat pulls, BTB  -WS      Sets 6  2  -WS      Reps 6  10  -WS         Exercise 7    Exercise Name 7   shrugs  -WS      Sets 7  2  -WS      Reps 7  10  -WS      Additional Comments  3#  -WS        User Key  (r) = Recorded By, (t) = Taken By, (c) = Cosigned By    Initials Name Provider Type    Mahendra Walker PTA Physical Therapy Assistant                      Manual Rx (last 36 hours)      Manual Treatments     Row Name 01/20/20 1300             Total Minutes    87384 - PT Manual Therapy Minutes  15  -WS         Manual Rx 1    Manual Rx 1 Location  Patient prone for DTM/MFR to B scapular musculature, B upper traps, B levator  -WS      Manual Rx 1 Type  Then Supine for passive stretching to B upper traps, levators, and STM to B deltoids and pectoral musculature  -WS      Manual Rx 1 Grade  Prone thoracic PAs  -WS        User Key  (r) = Recorded By, (t) = Taken By, (c) = Cosigned By    Initials Name Provider Type    Mahendra Walker PTA Physical Therapy Assistant          PT OP Goals     Row Name 01/20/20 1300          PT Short Term Goals    STG 1  Patient will be independent with initial HEP for posture and ROM/flexibility without increased symptoms  -WS     STG 1 Progress  Met  -WS     STG 2  Patient will be educated on and demonstrate knowledge of optimal posture and shoulder positioning to minimize strain on affected tissues with daily activities  -WS     STG 2 Progress  Partially Met  -WS     STG 3  Patient will report >/= 25% decreased pain with daily activities  -WS     STG 3 Progress  Met  -WS        Long Term Goals    LTG 1  Patient will be independent with established HEP for strength/stabilization to  facilitate self management of symptoms/condition.  -WS     LTG 1 Progress  Ongoing;Met  -WS     LTG 2  Patient will have postural/shoulder girdle strength >/= 4+/5 throughout for improved stability with desired activities  -WS     LTG 2 Progress  Ongoing;Met  -WS     LTG 3  Patient will be able to sleep >/= 5-6 hours without interruption 2/2 pain for improvement in restorative sleep  -WS     LTG 3 Progress  Met  -WS     LTG 4  Patient will report pain shoulder pain </= 2/10 with work tasks/leisure activities  -WS     LTG 4 Progress  Met  -WS     LTG 5  Patient will improve QuickDash score from  to </= indicating reduction in perceived functional disability  -       User Key  (r) = Recorded By, (t) = Taken By, (c) = Cosigned By    Initials Name Provider Type    Mahendra Walker PTA Physical Therapy Assistant          Therapy Education  Given: HEP, Symptoms/condition management  Program: Reinforced  How Provided: Verbal  Provided to: Patient              Time Calculation:   Start Time: 1300  Stop Time: 1340  Time Calculation (min): 40 min  Therapy Charges for Today     Code Description Service Date Service Provider Modifiers Qty    42345198836 HC PT THER PROC EA 15 MIN 1/20/2020 Mahendra David PTA GP 2    28072847733 HC PT MANUAL THERAPY EA 15 MIN 1/20/2020 Mahendra David PTA GP 1                    Mahendra David PTA  1/20/2020

## 2020-01-24 ENCOUNTER — OFFICE VISIT (OUTPATIENT)
Dept: ORTHOPEDIC SURGERY | Facility: CLINIC | Age: 55
End: 2020-01-24

## 2020-01-24 VITALS — TEMPERATURE: 98.3 F | WEIGHT: 198.1 LBS | BODY MASS INDEX: 33.01 KG/M2 | HEIGHT: 65 IN

## 2020-01-24 DIAGNOSIS — M25.512 CHRONIC PAIN OF BOTH SHOULDERS: Primary | ICD-10-CM

## 2020-01-24 DIAGNOSIS — M25.511 CHRONIC PAIN OF BOTH SHOULDERS: Primary | ICD-10-CM

## 2020-01-24 DIAGNOSIS — G89.29 CHRONIC PAIN OF BOTH SHOULDERS: Primary | ICD-10-CM

## 2020-01-24 DIAGNOSIS — G89.29 CHRONIC LEFT SHOULDER PAIN: ICD-10-CM

## 2020-01-24 DIAGNOSIS — M25.512 CHRONIC LEFT SHOULDER PAIN: ICD-10-CM

## 2020-01-24 DIAGNOSIS — M75.102 NONTRAUMATIC TEAR OF LEFT ROTATOR CUFF, UNSPECIFIED TEAR EXTENT: ICD-10-CM

## 2020-01-24 PROCEDURE — 73030 X-RAY EXAM OF SHOULDER: CPT | Performed by: ORTHOPAEDIC SURGERY

## 2020-01-24 PROCEDURE — 99203 OFFICE O/P NEW LOW 30 MIN: CPT | Performed by: ORTHOPAEDIC SURGERY

## 2020-01-24 NOTE — PROGRESS NOTES
New Bilateral Shoulder      Patient: Elodia Alicea        YOB: 1965    Medical Record Number: [id number]        Chief Complaints: bilateral shoulder pain      History of Present Illness: This is a 54-year-old female is right-hand-dominant presents with left shoulder pain this been ongoing for 8 months no one history injury change in activities she can recall she has done physical therapy for several months with no help at all.  She is taken some meloxicam that does help temporarily but her symptoms return when she is off of it.  She is a  and does a lot of desk work her past medical history is remarkable anxiety hyperlipidemia and hypertension      Allergies:   Allergies   Allergen Reactions   • Percocet [Oxycodone-Acetaminophen] Other (See Comments)     Feels like bugs crawling on her   • Codeine Unknown (See Comments)     unknown       Medications:   Home Medications:  Current Outpatient Medications on File Prior to Visit   Medication Sig   • atorvastatin (LIPITOR) 20 MG tablet Take 1 tablet by mouth Daily.   • citalopram (CeleXA) 10 MG tablet Take 10 mg by mouth Daily.   • EPINEPHrine (EPIPEN 2-JOSE) 0.3 MG/0.3ML solution auto-injector injection EPIPEN 2-JOSE 0.3 MG/0.3ML SOAJ   • lisinopril (PRINIVIL,ZESTRIL) 20 MG tablet Take 1 tablet by mouth Daily.   • meloxicam (MOBIC) 15 MG tablet Take 15 mg by mouth Daily.   • tiZANidine (ZANAFLEX) 2 MG tablet TAKE 1 TABLET BY MOUTH AT NIGHT AS NEEDED FOR MUSCLE SPASMS.   • zolpidem (AMBIEN) 10 MG tablet Take 1 tablet by mouth At Night As Needed for Sleep.     No current facility-administered medications on file prior to visit.      Current Medications:  Scheduled Meds:  Continuous Infusions:  No current facility-administered medications for this visit.   PRN Meds:.    Past Medical History:   Diagnosis Date   • Anxiety    • Hyperlipidemia    • Hypertension         Past Surgical History:   Procedure Laterality Date   • BREAST SURGERY     •   "SECTION          Social History     Occupational History   • Not on file   Tobacco Use   • Smoking status: Current Every Day Smoker     Packs/day: 0.25     Types: Cigarettes   • Smokeless tobacco: Former User   Substance and Sexual Activity   • Alcohol use: Yes     Comment: once every six month    • Drug use: Never   • Sexual activity: Not on file      Social History     Social History Narrative   • Not on file        Family History   Problem Relation Age of Onset   • Diabetes Mother    • Hypertension Mother    • Diabetes Father    • Hypertension Father    • Hyperlipidemia Father              Review of Systems: 14 point review of systems are remarkable for the pertinent positives listed in the chart by the patient the remainder negative    Review of Systems      Physical Exam: 54 y.o. female  General Appearance:    Alert, cooperative, in no acute distress                   Vitals:    01/24/20 1344   Temp: 98.3 °F (36.8 °C)   TempSrc: Temporal   Weight: 89.9 kg (198 lb 1.6 oz)   Height: 165.1 cm (65\")      Patient is alert and read ×3 no acute distress appears her above-listed at height weight and age.  Affect is normal respiratory rate is normal unlabored. Heart rate regular rate rhythm, sclera, dentition and hearing are normal for the purpose of this exam.    Ortho Exam Physical exam of the left shoulder reveals no overlying skin changes no lymphedema no lymphadenopathy.  Patient has active flexion 180 with mild symptoms abduction is similar external rotation is to 50 and internal rotation to the upper lumbar spine with mild symptoms.  Patient has good rotator cuff strength 4+ over 5 with isometric strength testing with pain.  Patient has a positive impingement and a positive William sign.  Patient has good cervical range of motion which is full and asymptomatic no radicular symptoms.  Patient has a normal elbow exam.  Good distal pulses are presentPatient has pain with overhead activity and a positive Neer sign " and a positive empty can sign  They have a positive drop arm any definitive painful arc      Procedures          Radiology:   AP, Scapular Y and Axillary Lateral of the left shoulder were ordered/reviewed to evauate shoulder pain.  I have no comparative films she has some acromioclavicular arthritis and some sclerosis at the insertion of the cuff no acute pathology  Imaging Results (Most Recent)     Procedure Component Value Units Date/Time    XR Shoulder 2+ View Left [[id number]] Resulted:  01/24/20 1341     Updated:  01/24/20 1341    Impression:       Ordering physician's impression is located in the Encounter Note dated 01/24/20. X-ray performed in the DR room.          Assessment/Plan: Left shoulder pain this been ongoing for many months she is done several months of physical therapy with strengthening stretching all with no lasting improvement plan is to proceed with an MRI and have her follow-up after that test

## 2020-01-29 ENCOUNTER — HOSPITAL ENCOUNTER (OUTPATIENT)
Dept: MRI IMAGING | Facility: HOSPITAL | Age: 55
Discharge: HOME | End: 2020-01-29
Admitting: ORTHOPAEDIC SURGERY

## 2020-01-29 DIAGNOSIS — M25.511 ACUTE PAIN OF BOTH SHOULDERS: ICD-10-CM

## 2020-01-29 DIAGNOSIS — M25.512 ACUTE PAIN OF BOTH SHOULDERS: ICD-10-CM

## 2020-01-29 DIAGNOSIS — M75.102 NONTRAUMATIC TEAR OF LEFT ROTATOR CUFF, UNSPECIFIED TEAR EXTENT: ICD-10-CM

## 2020-01-29 PROCEDURE — 73221 MRI JOINT UPR EXTREM W/O DYE: CPT

## 2020-01-29 RX ORDER — TIZANIDINE 2 MG/1
2 TABLET ORAL NIGHTLY PRN
Qty: 30 TABLET | Refills: 0 | Status: SHIPPED | OUTPATIENT
Start: 2020-01-29 | End: 2020-02-25

## 2020-02-10 ENCOUNTER — OFFICE VISIT (OUTPATIENT)
Dept: ORTHOPEDIC SURGERY | Facility: CLINIC | Age: 55
End: 2020-02-10

## 2020-02-10 VITALS — WEIGHT: 197 LBS | TEMPERATURE: 98 F | BODY MASS INDEX: 32.82 KG/M2 | HEIGHT: 65 IN

## 2020-02-10 DIAGNOSIS — IMO0002 BURSITIS/TENDONITIS, SHOULDER: Primary | ICD-10-CM

## 2020-02-10 PROCEDURE — 20610 DRAIN/INJ JOINT/BURSA W/O US: CPT | Performed by: ORTHOPAEDIC SURGERY

## 2020-02-10 RX ADMIN — METHYLPREDNISOLONE ACETATE 80 MG: 80 INJECTION, SUSPENSION INTRA-ARTICULAR; INTRALESIONAL; INTRAMUSCULAR; SOFT TISSUE at 15:12

## 2020-02-10 NOTE — PROGRESS NOTES
Left shoulder MRI Follow Up      Patient: Elodia Alicea        YOB: 1965            Chief Complaints: Left shoulder pain      History of Present Illness: The patient is here follow-up of an MRI of the knee MRI demonstrates some arthritis of the acromioclavicular joint some tendinopathy the rotator cuff no obvious tear we talked about options we have not injected I think that is reasonable and then add some significant strengthening which she knows what to do      Physical Exam: 54 y.o. female  General Appearance:    Alert, cooperative, in no acute distress                 There were no vitals filed for this visit.     Patient is alert and read ×3 no acute distress appears her above-listed at height weight and age.  Affect is normal respiratory rate is normal unlabored. Heart rate regular rate rhythm, sclera, dentition and hearing are normal for the purpose of this exam.      Ortho Exam Physical exam of the left shoulder reveals no overlying skin changes no lymphedema no lymphadenopathy.  Patient has active flexion 180 with mild symptoms abduction is similar external rotation is to 50 and internal rotation to the upper lumbar spine with mild symptoms.  Patient has good rotator cuff strength 4+ over 5 with isometric strength testing with pain.  Patient has a positive impingement and a positive William sign.  Patient has good cervical range of motion which is full and asymptomatic no radicular symptoms.  Patient has a normal elbow exam.  Good distal pulses are presentPatient has pain with overhead activity and a positive Neer sign and a positive empty can sign  They have a positive drop arm any definitive painful arc      MRI Results: MRIs as above I reviewed the films myself and agree with the findingsPrior x-rays show some acromioclavicular arthritis some sclerosis at the insertion of the cuff no acute pathology    Large Joint Arthrocentesis: L subacromial bursa  Date/Time: 2/10/2020 3:12 PM  Consent  given by: patient  Site marked: site marked  Timeout: Immediately prior to procedure a time out was called to verify the correct patient, procedure, equipment, support staff and site/side marked as required   Supporting Documentation  Indications: pain   Procedure Details  Location: shoulder - L subacromial bursa  Preparation: Patient was prepped and draped in the usual sterile fashion  Needle size: 22 G  Approach: posterior  Medications administered: 4 mL lidocaine (cardiac); 80 mg methylPREDNISolone acetate 80 MG/ML  Patient tolerance: patient tolerated the procedure well with no immediate complications            Assessment/Plan: Left shoulder pain I think this is rotator cuff probably impingement plan is to proceed with an injection is diagnostic and therapeutic get her into some aggressive strengthening and she fails improve could consider arthroscopic evaluation

## 2020-02-11 RX ORDER — METHYLPREDNISOLONE ACETATE 80 MG/ML
80 INJECTION, SUSPENSION INTRA-ARTICULAR; INTRALESIONAL; INTRAMUSCULAR; SOFT TISSUE
Status: COMPLETED | OUTPATIENT
Start: 2020-02-10 | End: 2020-02-10

## 2020-02-25 DIAGNOSIS — M25.512 ACUTE PAIN OF BOTH SHOULDERS: ICD-10-CM

## 2020-02-25 DIAGNOSIS — M25.511 ACUTE PAIN OF BOTH SHOULDERS: ICD-10-CM

## 2020-02-25 RX ORDER — TIZANIDINE 2 MG/1
2 TABLET ORAL NIGHTLY PRN
Qty: 30 TABLET | Refills: 0 | Status: SHIPPED | OUTPATIENT
Start: 2020-02-25 | End: 2020-03-11

## 2020-03-11 ENCOUNTER — OFFICE VISIT (OUTPATIENT)
Dept: FAMILY MEDICINE CLINIC | Facility: CLINIC | Age: 55
End: 2020-03-11

## 2020-03-11 VITALS
HEART RATE: 85 BPM | HEIGHT: 65 IN | BODY MASS INDEX: 32.49 KG/M2 | WEIGHT: 195 LBS | DIASTOLIC BLOOD PRESSURE: 91 MMHG | SYSTOLIC BLOOD PRESSURE: 126 MMHG | OXYGEN SATURATION: 97 %

## 2020-03-11 DIAGNOSIS — I10 ESSENTIAL HYPERTENSION: Primary | ICD-10-CM

## 2020-03-11 DIAGNOSIS — G47.09 OTHER INSOMNIA: ICD-10-CM

## 2020-03-11 PROCEDURE — 99213 OFFICE O/P EST LOW 20 MIN: CPT | Performed by: FAMILY MEDICINE

## 2020-03-11 NOTE — PROGRESS NOTES
Subjective   Elodia Alicea is a 54 y.o. female.     She comes in for follow-up after her lisinopril was increased to 20 mg 3 months ago  Her Ambien was also increased to 10 mg  She feels well and is sleeping better  Does not need refills       The following portions of the patient's history were reviewed and updated as appropriate: allergies, current medications, past family history, past medical history, past social history, past surgical history and problem list.  Past Medical History:   Diagnosis Date   • Anxiety    • Hyperlipidemia    • Hypertension      Past Surgical History:   Procedure Laterality Date   • BREAST SURGERY     •  SECTION       Family History   Problem Relation Age of Onset   • Diabetes Mother    • Hypertension Mother    • Diabetes Father    • Hypertension Father    • Hyperlipidemia Father      Social History     Socioeconomic History   • Marital status:      Spouse name: Not on file   • Number of children: Not on file   • Years of education: Not on file   • Highest education level: Not on file   Tobacco Use   • Smoking status: Current Every Day Smoker     Packs/day: 0.50     Types: Cigarettes   • Smokeless tobacco: Former User   Substance and Sexual Activity   • Alcohol use: Yes     Comment: once every six month    • Drug use: Never   • Sexual activity: Defer         Current Outpatient Medications:   •  atorvastatin (LIPITOR) 20 MG tablet, Take 1 tablet by mouth Daily., Disp: 30 tablet, Rfl: 2  •  citalopram (CeleXA) 10 MG tablet, Take 10 mg by mouth Daily., Disp: , Rfl: 3  •  EPINEPHrine (EPIPEN 2-JOSE) 0.3 MG/0.3ML solution auto-injector injection, EPIPEN 2-JOSE 0.3 MG/0.3ML SOAJ, Disp: , Rfl:   •  lisinopril (PRINIVIL,ZESTRIL) 20 MG tablet, Take 1 tablet by mouth Daily., Disp: 90 tablet, Rfl: 3  •  zolpidem (AMBIEN) 10 MG tablet, Take 1 tablet by mouth At Night As Needed for Sleep., Disp: 90 tablet, Rfl: 1    Review of Systems   Constitutional: Negative for diaphoresis, fatigue,  "fever, unexpected weight gain and unexpected weight loss.   Respiratory: Negative for cough, chest tightness and shortness of breath.    Cardiovascular: Negative for chest pain, palpitations and leg swelling.   Gastrointestinal: Negative for nausea and vomiting.   Neurological: Negative for dizziness, syncope and headache.     /91 (BP Location: Left arm, Patient Position: Sitting, Cuff Size: Large Adult)   Pulse 85   Ht 165.1 cm (65\")   Wt 88.5 kg (195 lb)   SpO2 97%   BMI 32.45 kg/m²       Objective   Physical Exam   Constitutional: She appears well-developed and well-nourished. No distress.   HENT:   Head: Normocephalic and atraumatic.   Neck: Neck supple.   Cardiovascular: Normal rate, regular rhythm, normal heart sounds and intact distal pulses. Exam reveals no gallop and no friction rub.   No murmur heard.  Pulmonary/Chest: Effort normal and breath sounds normal. No respiratory distress. She has no wheezes. She has no rales.   Musculoskeletal: She exhibits no edema.   Lymphadenopathy:     She has no cervical adenopathy.   Neurological: She is alert.   Skin: Skin is warm and dry.   Psychiatric: She has a normal mood and affect.   Nursing note and vitals reviewed.        Assessment/Plan   Problems Addressed this Visit        Cardiovascular and Mediastinum    Hypertension - Primary       Other    Other insomnia        Counseled on need for weight loss  Congratulated on her weight loss thus far  She will continue her current medications  Will see her back in 6 mo         "

## 2020-03-18 RX ORDER — ATORVASTATIN CALCIUM 20 MG/1
20 TABLET, FILM COATED ORAL DAILY
Qty: 30 TABLET | Refills: 2 | Status: SHIPPED | OUTPATIENT
Start: 2020-03-18 | End: 2020-07-02

## 2020-03-27 DIAGNOSIS — M25.511 ACUTE PAIN OF BOTH SHOULDERS: ICD-10-CM

## 2020-03-27 DIAGNOSIS — M25.512 ACUTE PAIN OF BOTH SHOULDERS: ICD-10-CM

## 2020-03-27 RX ORDER — TIZANIDINE 2 MG/1
2 TABLET ORAL NIGHTLY PRN
Qty: 30 TABLET | Refills: 0 | Status: SHIPPED | OUTPATIENT
Start: 2020-03-27 | End: 2020-04-26

## 2020-04-26 DIAGNOSIS — M25.512 ACUTE PAIN OF BOTH SHOULDERS: ICD-10-CM

## 2020-04-26 DIAGNOSIS — M25.511 ACUTE PAIN OF BOTH SHOULDERS: ICD-10-CM

## 2020-04-26 RX ORDER — TIZANIDINE 2 MG/1
2 TABLET ORAL NIGHTLY PRN
Qty: 30 TABLET | Refills: 0 | Status: SHIPPED | OUTPATIENT
Start: 2020-04-26 | End: 2020-05-19

## 2020-04-28 RX ORDER — EPINEPHRINE 0.3 MG/.3ML
INJECTION SUBCUTANEOUS
Qty: 1 EACH | Refills: 11 | Status: SHIPPED | OUTPATIENT
Start: 2020-04-28

## 2020-05-19 DIAGNOSIS — M25.512 ACUTE PAIN OF BOTH SHOULDERS: ICD-10-CM

## 2020-05-19 DIAGNOSIS — M25.511 ACUTE PAIN OF BOTH SHOULDERS: ICD-10-CM

## 2020-05-19 RX ORDER — TIZANIDINE 2 MG/1
2 TABLET ORAL NIGHTLY PRN
Qty: 30 TABLET | Refills: 0 | Status: SHIPPED | OUTPATIENT
Start: 2020-05-19 | End: 2020-09-15

## 2020-06-02 RX ORDER — CITALOPRAM 10 MG/1
TABLET ORAL
Qty: 90 TABLET | Refills: 2 | Status: SHIPPED | OUTPATIENT
Start: 2020-06-02 | End: 2021-01-04 | Stop reason: SDUPTHER

## 2020-06-08 DIAGNOSIS — G47.00 INSOMNIA, UNSPECIFIED TYPE: ICD-10-CM

## 2020-06-09 RX ORDER — ZOLPIDEM TARTRATE 10 MG/1
10 TABLET ORAL NIGHTLY PRN
Qty: 30 TABLET | Refills: 1 | Status: SHIPPED | OUTPATIENT
Start: 2020-06-09 | End: 2020-08-12

## 2020-07-02 RX ORDER — ATORVASTATIN CALCIUM 20 MG/1
TABLET, FILM COATED ORAL
Qty: 90 TABLET | Refills: 1 | Status: SHIPPED | OUTPATIENT
Start: 2020-07-02 | End: 2021-09-03 | Stop reason: SDUPTHER

## 2020-08-11 DIAGNOSIS — G47.00 INSOMNIA, UNSPECIFIED TYPE: ICD-10-CM

## 2020-08-12 RX ORDER — ZOLPIDEM TARTRATE 10 MG/1
10 TABLET ORAL NIGHTLY PRN
Qty: 30 TABLET | Refills: 5 | Status: SHIPPED | OUTPATIENT
Start: 2020-08-12 | End: 2021-02-06

## 2020-09-15 ENCOUNTER — LAB (OUTPATIENT)
Dept: FAMILY MEDICINE CLINIC | Facility: CLINIC | Age: 55
End: 2020-09-15

## 2020-09-15 ENCOUNTER — OFFICE VISIT (OUTPATIENT)
Dept: FAMILY MEDICINE CLINIC | Facility: CLINIC | Age: 55
End: 2020-09-15

## 2020-09-15 VITALS
HEART RATE: 76 BPM | HEIGHT: 65 IN | BODY MASS INDEX: 33.59 KG/M2 | OXYGEN SATURATION: 98 % | DIASTOLIC BLOOD PRESSURE: 89 MMHG | TEMPERATURE: 96.8 F | WEIGHT: 201.6 LBS | SYSTOLIC BLOOD PRESSURE: 139 MMHG

## 2020-09-15 DIAGNOSIS — E78.2 MIXED HYPERLIPIDEMIA: Primary | ICD-10-CM

## 2020-09-15 DIAGNOSIS — Z11.59 NEED FOR HEPATITIS C SCREENING TEST: ICD-10-CM

## 2020-09-15 DIAGNOSIS — I10 ESSENTIAL HYPERTENSION: ICD-10-CM

## 2020-09-15 PROCEDURE — 99213 OFFICE O/P EST LOW 20 MIN: CPT | Performed by: FAMILY MEDICINE

## 2020-09-15 RX ORDER — LISINOPRIL 10 MG/1
10 TABLET ORAL DAILY
Qty: 90 TABLET | Refills: 3 | Status: SHIPPED | OUTPATIENT
Start: 2020-09-15 | End: 2020-12-21 | Stop reason: SDUPTHER

## 2020-09-15 NOTE — PROGRESS NOTES
Subjective   Elodia Alicea is a 55 y.o. female.     Here for follow up on bp and chol  She stopped smoking  Pap smear done 2019  cologuard neg and done in 2019  Has only been taking half her bp med  Stress is down and bp was running lower  Off her chol med for about a month - ran out  She is moving more and eating better       The following portions of the patient's history were reviewed and updated as appropriate: allergies, current medications, past family history, past medical history, past social history, past surgical history and problem list.  Past Medical History:   Diagnosis Date   • Anxiety    • Hyperlipidemia    • Hypertension      Past Surgical History:   Procedure Laterality Date   • BREAST SURGERY     •  SECTION       Family History   Problem Relation Age of Onset   • Diabetes Mother    • Hypertension Mother    • Diabetes Father    • Hypertension Father    • Hyperlipidemia Father      Social History     Socioeconomic History   • Marital status:      Spouse name: Not on file   • Number of children: Not on file   • Years of education: Not on file   • Highest education level: Not on file   Tobacco Use   • Smoking status: Former Smoker     Packs/day: 0.50     Types: Cigarettes     Quit date: 3/31/2020     Years since quittin.4   • Smokeless tobacco: Former User   Substance and Sexual Activity   • Alcohol use: Yes     Comment: once every six month    • Drug use: Never   • Sexual activity: Defer         Current Outpatient Medications:   •  citalopram (CeleXA) 10 MG tablet, TAKE 1 TABLET BY MOUTH EVERY DAY, Disp: 90 tablet, Rfl: 2  •  EPINEPHrine (EPIPEN) 0.3 MG/0.3ML solution auto-injector injection, INJECT INTO THIGH AND HOLD 10 SEC FOR ALLERGIC REACTION, Disp: 1 each, Rfl: 11  •  lisinopril (PRINIVIL,ZESTRIL) 20 MG tablet, Take 1 tablet by mouth Daily. (Patient taking differently: Take 10 mg by mouth Daily.), Disp: 90 tablet, Rfl: 3  •  zolpidem (AMBIEN) 10 MG tablet, TAKE 1 TABLET BY  "MOUTH AT NIGHT AS NEEDED FOR SLEEP., Disp: 30 tablet, Rfl: 5  •  atorvastatin (LIPITOR) 20 MG tablet, TAKE 1 TABLET BY MOUTH EVERY DAY, Disp: 90 tablet, Rfl: 1    Review of Systems   Constitutional: Negative for diaphoresis, fatigue, fever, unexpected weight gain and unexpected weight loss.   Respiratory: Negative for cough, chest tightness and shortness of breath.    Cardiovascular: Negative for chest pain, palpitations and leg swelling.   Gastrointestinal: Negative for nausea and vomiting.   Neurological: Negative for dizziness, syncope and headache.     /89 (BP Location: Left arm, Patient Position: Sitting, Cuff Size: Adult)   Pulse 76   Temp 96.8 °F (36 °C) (Temporal)   Ht 165.1 cm (65\")   Wt 91.4 kg (201 lb 9.6 oz)   SpO2 98%   Breastfeeding No   BMI 33.55 kg/m²       Objective   Physical Exam  Nursing note reviewed.   Constitutional:       General: She is not in acute distress.     Appearance: She is well-developed, well-groomed and overweight.   HENT:      Head: Normocephalic and atraumatic.   Neck:      Musculoskeletal: Neck supple.      Thyroid: No thyromegaly.      Vascular: No JVD.   Cardiovascular:      Rate and Rhythm: Normal rate and regular rhythm.      Heart sounds: Normal heart sounds. No murmur. No friction rub. No gallop.    Pulmonary:      Effort: Pulmonary effort is normal. No respiratory distress.      Breath sounds: Normal breath sounds. No wheezing or rales.   Lymphadenopathy:      Cervical: No cervical adenopathy.   Skin:     General: Skin is warm and dry.   Neurological:      Mental Status: She is alert.   Psychiatric:         Attention and Perception: Attention normal.           Assessment/Plan   Problems Addressed this Visit        Cardiovascular and Mediastinum    Hyperlipidemia - Primary    Relevant Orders    Comprehensive Metabolic Panel    Lipid Panel    Hypertension    Relevant Medications    lisinopril (PRINIVIL,ZESTRIL) 10 MG tablet    Other Relevant Orders    " Comprehensive Metabolic Panel    Lipid Panel      Other Visit Diagnoses     Need for hepatitis C screening test        Relevant Orders    Hepatitis C Antibody          She was congratulated on her smoking cessation  Counseled on need for weight loss  Lisinopril dose adjusted and refilled  Labs ordered  I will see her back in 6 months for follow-up and physical  She will get her flu shot at work

## 2020-11-17 ENCOUNTER — LAB REQUISITION (OUTPATIENT)
Dept: LAB | Facility: HOSPITAL | Age: 55
End: 2020-11-17

## 2020-11-17 DIAGNOSIS — Z00.00 ROUTINE GENERAL MEDICAL EXAMINATION AT A HEALTH CARE FACILITY: ICD-10-CM

## 2020-11-19 LAB
CHOLEST SERPL-MCNC: 273 MG/DL (ref 0–200)
HBA1C MFR BLD: 5.6 % (ref 3.5–5.6)
HDLC SERPL-MCNC: 47 MG/DL (ref 40–60)
LDLC SERPL CALC-MCNC: 169 MG/DL (ref 0–100)
LDLC/HDLC SERPL: 3.52 {RATIO}
TRIGL SERPL-MCNC: 302 MG/DL (ref 0–150)
VLDLC SERPL-MCNC: 57 MG/DL (ref 5–40)

## 2020-11-19 PROCEDURE — 80061 LIPID PANEL: CPT

## 2020-11-19 PROCEDURE — 83036 HEMOGLOBIN GLYCOSYLATED A1C: CPT

## 2020-12-07 RX ORDER — LISINOPRIL 20 MG/1
TABLET ORAL
Qty: 30 TABLET | Refills: 11 | OUTPATIENT
Start: 2020-12-07

## 2020-12-21 RX ORDER — LISINOPRIL 10 MG/1
10 TABLET ORAL DAILY
Qty: 90 TABLET | Refills: 3 | Status: SHIPPED | OUTPATIENT
Start: 2020-12-21 | End: 2021-02-06 | Stop reason: SDUPTHER

## 2021-01-04 RX ORDER — CITALOPRAM 10 MG/1
10 TABLET ORAL DAILY
Qty: 90 TABLET | Refills: 2 | Status: SHIPPED | OUTPATIENT
Start: 2021-01-04 | End: 2022-03-14 | Stop reason: SDUPTHER

## 2021-01-07 RX ORDER — LISINOPRIL 20 MG/1
TABLET ORAL
Qty: 30 TABLET | Refills: 11 | OUTPATIENT
Start: 2021-01-07

## 2021-02-06 DIAGNOSIS — G47.00 INSOMNIA, UNSPECIFIED TYPE: ICD-10-CM

## 2021-02-06 RX ORDER — LISINOPRIL 10 MG/1
10 TABLET ORAL DAILY
Qty: 90 TABLET | Refills: 1 | Status: SHIPPED | OUTPATIENT
Start: 2021-02-06 | End: 2021-03-24 | Stop reason: DRUGHIGH

## 2021-02-06 RX ORDER — LISINOPRIL 20 MG/1
TABLET ORAL
Qty: 30 TABLET | Refills: 11 | OUTPATIENT
Start: 2021-02-06

## 2021-02-06 RX ORDER — ZOLPIDEM TARTRATE 10 MG/1
10 TABLET ORAL NIGHTLY PRN
Qty: 30 TABLET | Refills: 5 | Status: SHIPPED | OUTPATIENT
Start: 2021-02-06 | End: 2022-04-20

## 2021-03-24 ENCOUNTER — LAB (OUTPATIENT)
Dept: FAMILY MEDICINE CLINIC | Facility: CLINIC | Age: 56
End: 2021-03-24

## 2021-03-24 ENCOUNTER — OFFICE VISIT (OUTPATIENT)
Dept: FAMILY MEDICINE CLINIC | Facility: CLINIC | Age: 56
End: 2021-03-24

## 2021-03-24 ENCOUNTER — BULK ORDERING (OUTPATIENT)
Dept: CASE MANAGEMENT | Facility: OTHER | Age: 56
End: 2021-03-24

## 2021-03-24 VITALS
HEIGHT: 65 IN | DIASTOLIC BLOOD PRESSURE: 97 MMHG | HEART RATE: 87 BPM | BODY MASS INDEX: 33.99 KG/M2 | SYSTOLIC BLOOD PRESSURE: 145 MMHG | WEIGHT: 204 LBS | OXYGEN SATURATION: 98 % | TEMPERATURE: 97.5 F

## 2021-03-24 DIAGNOSIS — Z00.00 ENCOUNTER FOR GENERAL ADULT MEDICAL EXAMINATION WITHOUT ABNORMAL FINDINGS: Primary | ICD-10-CM

## 2021-03-24 DIAGNOSIS — Z23 IMMUNIZATION DUE: ICD-10-CM

## 2021-03-24 DIAGNOSIS — Z00.00 ENCOUNTER FOR GENERAL ADULT MEDICAL EXAMINATION WITHOUT ABNORMAL FINDINGS: ICD-10-CM

## 2021-03-24 DIAGNOSIS — Z12.31 BREAST CANCER SCREENING BY MAMMOGRAM: ICD-10-CM

## 2021-03-24 LAB
ALBUMIN SERPL-MCNC: 4.5 G/DL (ref 3.5–5.2)
ALBUMIN/GLOB SERPL: 1.8 G/DL
ALP SERPL-CCNC: 66 U/L (ref 39–117)
ALT SERPL W P-5'-P-CCNC: 28 U/L (ref 1–33)
ANION GAP SERPL CALCULATED.3IONS-SCNC: 8.5 MMOL/L (ref 5–15)
AST SERPL-CCNC: 23 U/L (ref 1–32)
BILIRUB BLD-MCNC: NEGATIVE MG/DL
BILIRUB SERPL-MCNC: 0.3 MG/DL (ref 0–1.2)
BUN SERPL-MCNC: 13 MG/DL (ref 6–20)
BUN/CREAT SERPL: 21.7 (ref 7–25)
CALCIUM SPEC-SCNC: 9.2 MG/DL (ref 8.6–10.5)
CHLORIDE SERPL-SCNC: 103 MMOL/L (ref 98–107)
CHOLEST SERPL-MCNC: 242 MG/DL (ref 0–200)
CLARITY, POC: CLEAR
CO2 SERPL-SCNC: 28.5 MMOL/L (ref 22–29)
COLOR UR: YELLOW
CREAT SERPL-MCNC: 0.6 MG/DL (ref 0.57–1)
GFR SERPL CREATININE-BSD FRML MDRD: 103 ML/MIN/1.73
GLOBULIN UR ELPH-MCNC: 2.5 GM/DL
GLUCOSE SERPL-MCNC: 81 MG/DL (ref 65–99)
GLUCOSE UR STRIP-MCNC: NEGATIVE MG/DL
HDLC SERPL-MCNC: 43 MG/DL (ref 40–60)
KETONES UR QL: NEGATIVE
LDLC SERPL CALC-MCNC: 152 MG/DL (ref 0–100)
LDLC/HDLC SERPL: 3.44 {RATIO}
LEUKOCYTE EST, POC: NEGATIVE
NITRITE UR-MCNC: NEGATIVE MG/ML
PH UR: 5 [PH] (ref 5–8)
POTASSIUM SERPL-SCNC: 4.3 MMOL/L (ref 3.5–5.2)
PROT SERPL-MCNC: 7 G/DL (ref 6–8.5)
PROT UR STRIP-MCNC: NEGATIVE MG/DL
RBC # UR STRIP: NEGATIVE /UL
SODIUM SERPL-SCNC: 140 MMOL/L (ref 136–145)
SP GR UR: 1.02 (ref 1–1.03)
TRIGL SERPL-MCNC: 256 MG/DL (ref 0–150)
TSH SERPL DL<=0.05 MIU/L-ACNC: 0.67 UIU/ML (ref 0.27–4.2)
UROBILINOGEN UR QL: NORMAL
VLDLC SERPL-MCNC: 47 MG/DL (ref 5–40)

## 2021-03-24 PROCEDURE — 99396 PREV VISIT EST AGE 40-64: CPT | Performed by: FAMILY MEDICINE

## 2021-03-24 PROCEDURE — 36415 COLL VENOUS BLD VENIPUNCTURE: CPT | Performed by: FAMILY MEDICINE

## 2021-03-24 PROCEDURE — 84443 ASSAY THYROID STIM HORMONE: CPT | Performed by: FAMILY MEDICINE

## 2021-03-24 PROCEDURE — 80061 LIPID PANEL: CPT | Performed by: FAMILY MEDICINE

## 2021-03-24 PROCEDURE — 80053 COMPREHEN METABOLIC PANEL: CPT | Performed by: FAMILY MEDICINE

## 2021-03-24 PROCEDURE — 81003 URINALYSIS AUTO W/O SCOPE: CPT | Performed by: FAMILY MEDICINE

## 2021-03-24 RX ORDER — LISINOPRIL 20 MG/1
20 TABLET ORAL DAILY
Qty: 90 TABLET | Refills: 3 | Status: SHIPPED | OUTPATIENT
Start: 2021-03-24 | End: 2022-06-16 | Stop reason: SDUPTHER

## 2021-03-24 NOTE — PROGRESS NOTES
Subjective   Elodia Alicea is a 55 y.o. female.     Here for CPE  Last Pap smear was normal in 2019  She has been out of her bp medication for a few months       The following portions of the patient's history were reviewed and updated as appropriate: allergies, current medications, past family history, past medical history, past social history, past surgical history, and problem list.  Past Medical History:   Diagnosis Date   • Anxiety    • Hyperlipidemia    • Hypertension      Past Surgical History:   Procedure Laterality Date   • BREAST SURGERY     •  SECTION       Family History   Problem Relation Age of Onset   • Diabetes Mother    • Hypertension Mother    • Diabetes Father    • Hypertension Father    • Hyperlipidemia Father      Social History     Socioeconomic History   • Marital status:      Spouse name: Not on file   • Number of children: Not on file   • Years of education: Not on file   • Highest education level: Not on file   Tobacco Use   • Smoking status: Former Smoker     Packs/day: 0.50     Types: Cigarettes     Quit date: 3/31/2020     Years since quittin.9   • Smokeless tobacco: Former User   Substance and Sexual Activity   • Alcohol use: Yes     Comment: once every six month    • Drug use: Never   • Sexual activity: Defer         Current Outpatient Medications:   •  atorvastatin (LIPITOR) 20 MG tablet, TAKE 1 TABLET BY MOUTH EVERY DAY, Disp: 90 tablet, Rfl: 1  •  citalopram (CeleXA) 10 MG tablet, Take 1 tablet by mouth Daily., Disp: 90 tablet, Rfl: 2  •  EPINEPHrine (EPIPEN) 0.3 MG/0.3ML solution auto-injector injection, INJECT INTO THIGH AND HOLD 10 SEC FOR ALLERGIC REACTION, Disp: 1 each, Rfl: 11  •  zolpidem (AMBIEN) 10 MG tablet, TAKE 1 TABLET BY MOUTH AT NIGHT AS NEEDED FOR SLEEP., Disp: 30 tablet, Rfl: 5  •  lisinopril (PRINIVIL,ZESTRIL) 20 MG tablet, Take 1 tablet by mouth Daily., Disp: 90 tablet, Rfl: 3    Review of Systems   Constitutional: Negative.    HENT: Negative.   "  Respiratory: Negative.    Cardiovascular: Negative.    Gastrointestinal: Negative.    Endocrine: Negative.    Genitourinary: Negative.    Musculoskeletal: Negative.    Skin: Negative.    Neurological: Negative.    Psychiatric/Behavioral: Negative for self-injury and suicidal ideas. The patient is nervous/anxious.      /97 (BP Location: Left arm, Patient Position: Sitting, Cuff Size: Large Adult)   Pulse 87   Temp 97.5 °F (36.4 °C) (Temporal)   Ht 165.1 cm (65\")   Wt 92.5 kg (204 lb)   SpO2 98%   Breastfeeding No   BMI 33.95 kg/m²       Objective   Physical Exam  Vitals and nursing note reviewed. Exam conducted with a chaperone present.   Constitutional:       Appearance: Normal appearance. She is well-developed and well-groomed. She is obese.   HENT:      Head: Normocephalic and atraumatic.      Right Ear: Tympanic membrane, ear canal and external ear normal.      Left Ear: Tympanic membrane, ear canal and external ear normal.      Nose: Nose normal.      Mouth/Throat:      Mouth: Mucous membranes are moist.      Pharynx: Oropharynx is clear.   Eyes:      Extraocular Movements: Extraocular movements intact.      Conjunctiva/sclera: Conjunctivae normal.      Pupils: Pupils are equal, round, and reactive to light.   Neck:      Thyroid: No thyromegaly.      Vascular: No carotid bruit.   Cardiovascular:      Rate and Rhythm: Normal rate and regular rhythm.      Pulses: Normal pulses.      Heart sounds: Normal heart sounds.   Pulmonary:      Effort: Pulmonary effort is normal.      Breath sounds: Normal breath sounds.   Chest:      Breasts:         Right: Normal.         Left: Normal.   Abdominal:      General: Abdomen is flat. Bowel sounds are normal.      Palpations: Abdomen is soft. There is no hepatomegaly, splenomegaly or mass.      Tenderness: There is no abdominal tenderness.      Hernia: No hernia is present.   Musculoskeletal:      Cervical back: Normal range of motion and neck supple.      Right " lower leg: No edema.      Left lower leg: No edema.   Lymphadenopathy:      Cervical: No cervical adenopathy.      Upper Body:      Right upper body: No supraclavicular or axillary adenopathy.      Left upper body: No supraclavicular or axillary adenopathy.   Skin:     General: Skin is warm and dry.      Findings: No lesion or rash.   Neurological:      General: No focal deficit present.      Mental Status: She is alert.      Motor: Motor function is intact.      Deep Tendon Reflexes: Reflexes are normal and symmetric.   Psychiatric:         Attention and Perception: Attention normal.         Mood and Affect: Mood normal.         Behavior: Behavior is cooperative.       Brief Urine Lab Results  (Last result in the past 365 days)      Color   Clarity   Blood   Leuk Est   Nitrite   Protein   CREAT   Urine HCG        03/24/21 0825 Yellow Clear Negative Negative Negative Negative                 Assessment/Plan   Problems Addressed this Visit        Health Encounters    Encounter for general adult medical examination without abnormal findings - Primary    Relevant Orders    Comprehensive Metabolic Panel    Lipid Panel    TSH      Other Visit Diagnoses     Breast cancer screening by mammogram        Relevant Orders    Mammo Screening Digital Tomosynthesis Bilateral With CAD      Diagnoses       Codes Comments    Encounter for general adult medical examination without abnormal findings    -  Primary ICD-10-CM: Z00.00  ICD-9-CM: V70.9     Breast cancer screening by mammogram     ICD-10-CM: Z12.31  ICD-9-CM: V76.12         I refilled her blood pressure medication which should bring her blood pressure back down to normal and decrease her anxiety  Labs were ordered  She has had both of her Covid vaccines and had her flu shot at work  Mammogram is ordered  Counseled on the need for weight loss  I will see her back in a year for follow-up and Medicare wellness

## 2021-04-02 ENCOUNTER — HOSPITAL ENCOUNTER (OUTPATIENT)
Dept: MAMMOGRAPHY | Facility: HOSPITAL | Age: 56
Discharge: HOME | End: 2021-04-02
Admitting: FAMILY MEDICINE

## 2021-04-02 DIAGNOSIS — Z12.31 BREAST CANCER SCREENING BY MAMMOGRAM: ICD-10-CM

## 2021-04-02 PROCEDURE — 77067 SCR MAMMO BI INCL CAD: CPT

## 2021-04-02 PROCEDURE — 77063 BREAST TOMOSYNTHESIS BI: CPT

## 2021-04-09 ENCOUNTER — HOSPITAL ENCOUNTER (OUTPATIENT)
Dept: ULTRASOUND IMAGING | Facility: HOSPITAL | Age: 56
Discharge: HOME | End: 2021-04-09

## 2021-04-09 ENCOUNTER — HOSPITAL ENCOUNTER (OUTPATIENT)
Dept: MAMMOGRAPHY | Facility: HOSPITAL | Age: 56
Discharge: HOME | End: 2021-04-09

## 2021-04-09 DIAGNOSIS — R92.8 ABNORMALITY OF LEFT BREAST ON SCREENING MAMMOGRAM: ICD-10-CM

## 2021-04-09 PROCEDURE — 77065 DX MAMMO INCL CAD UNI: CPT

## 2021-04-09 PROCEDURE — 76642 ULTRASOUND BREAST LIMITED: CPT

## 2021-04-09 PROCEDURE — G0279 TOMOSYNTHESIS, MAMMO: HCPCS

## 2021-08-05 DIAGNOSIS — G47.09 OTHER INSOMNIA: Primary | ICD-10-CM

## 2021-08-05 RX ORDER — ZOLPIDEM TARTRATE 10 MG/1
10 TABLET ORAL NIGHTLY PRN
Qty: 30 TABLET | Refills: 2 | Status: SHIPPED | OUTPATIENT
Start: 2021-08-05 | End: 2021-10-31 | Stop reason: SDUPTHER

## 2021-09-03 RX ORDER — ATORVASTATIN CALCIUM 20 MG/1
20 TABLET, FILM COATED ORAL DAILY
Qty: 90 TABLET | Refills: 2 | Status: SHIPPED | OUTPATIENT
Start: 2021-09-03 | End: 2022-06-16 | Stop reason: SDUPTHER

## 2021-10-31 DIAGNOSIS — G47.09 OTHER INSOMNIA: ICD-10-CM

## 2021-10-31 RX ORDER — ZOLPIDEM TARTRATE 10 MG/1
10 TABLET ORAL NIGHTLY PRN
Qty: 30 TABLET | Refills: 2 | Status: SHIPPED | OUTPATIENT
Start: 2021-10-31 | End: 2022-01-26 | Stop reason: SDUPTHER

## 2021-12-14 RX ORDER — CITALOPRAM 10 MG/1
10 TABLET ORAL DAILY
Qty: 90 TABLET | Refills: 0 | Status: SHIPPED | OUTPATIENT
Start: 2021-12-14 | End: 2022-06-16 | Stop reason: SDUPTHER

## 2022-01-26 DIAGNOSIS — G47.09 OTHER INSOMNIA: ICD-10-CM

## 2022-01-26 RX ORDER — ZOLPIDEM TARTRATE 10 MG/1
10 TABLET ORAL NIGHTLY PRN
Qty: 30 TABLET | Refills: 2 | Status: CANCELLED | OUTPATIENT
Start: 2022-01-26

## 2022-01-27 DIAGNOSIS — G47.09 OTHER INSOMNIA: ICD-10-CM

## 2022-01-27 RX ORDER — ZOLPIDEM TARTRATE 10 MG/1
10 TABLET ORAL NIGHTLY PRN
Qty: 30 TABLET | Refills: 1 | Status: SHIPPED | OUTPATIENT
Start: 2022-01-27 | End: 2022-03-27 | Stop reason: SDUPTHER

## 2022-03-14 RX ORDER — CITALOPRAM 10 MG/1
10 TABLET ORAL DAILY
Qty: 90 TABLET | Refills: 0 | Status: SHIPPED | OUTPATIENT
Start: 2022-03-14 | End: 2022-04-20

## 2022-03-27 DIAGNOSIS — G47.09 OTHER INSOMNIA: ICD-10-CM

## 2022-03-28 DIAGNOSIS — G47.09 OTHER INSOMNIA: ICD-10-CM

## 2022-03-28 RX ORDER — ZOLPIDEM TARTRATE 10 MG/1
10 TABLET ORAL NIGHTLY PRN
Qty: 30 TABLET | Refills: 1 | Status: CANCELLED | OUTPATIENT
Start: 2022-03-28

## 2022-03-28 RX ORDER — ZOLPIDEM TARTRATE 10 MG/1
10 TABLET ORAL NIGHTLY PRN
Qty: 30 TABLET | Refills: 1 | Status: SHIPPED | OUTPATIENT
Start: 2022-03-28 | End: 2022-05-25 | Stop reason: SDUPTHER

## 2022-04-20 ENCOUNTER — OFFICE VISIT (OUTPATIENT)
Dept: FAMILY MEDICINE CLINIC | Facility: CLINIC | Age: 57
End: 2022-04-20

## 2022-04-20 ENCOUNTER — HOSPITAL ENCOUNTER (OUTPATIENT)
Dept: GENERAL RADIOLOGY | Facility: HOSPITAL | Age: 57
Discharge: HOME | End: 2022-04-20
Admitting: FAMILY MEDICINE

## 2022-04-20 ENCOUNTER — LAB (OUTPATIENT)
Dept: FAMILY MEDICINE CLINIC | Facility: CLINIC | Age: 57
End: 2022-04-20

## 2022-04-20 VITALS
SYSTOLIC BLOOD PRESSURE: 140 MMHG | DIASTOLIC BLOOD PRESSURE: 90 MMHG | TEMPERATURE: 97.8 F | HEIGHT: 65 IN | BODY MASS INDEX: 32.65 KG/M2 | HEART RATE: 85 BPM | WEIGHT: 196 LBS | OXYGEN SATURATION: 98 %

## 2022-04-20 DIAGNOSIS — I10 PRIMARY HYPERTENSION: ICD-10-CM

## 2022-04-20 DIAGNOSIS — E78.2 MIXED HYPERLIPIDEMIA: Primary | ICD-10-CM

## 2022-04-20 DIAGNOSIS — Z12.11 SCREENING FOR COLON CANCER: ICD-10-CM

## 2022-04-20 DIAGNOSIS — M25.561 ACUTE PAIN OF RIGHT KNEE: ICD-10-CM

## 2022-04-20 DIAGNOSIS — E78.2 MIXED HYPERLIPIDEMIA: ICD-10-CM

## 2022-04-20 LAB
ALBUMIN SERPL-MCNC: 4.8 G/DL (ref 3.5–5.2)
ALBUMIN/GLOB SERPL: 2 G/DL
ALP SERPL-CCNC: 64 U/L (ref 39–117)
ALT SERPL W P-5'-P-CCNC: 26 U/L (ref 1–33)
ANION GAP SERPL CALCULATED.3IONS-SCNC: 10.2 MMOL/L (ref 5–15)
AST SERPL-CCNC: 19 U/L (ref 1–32)
BILIRUB SERPL-MCNC: 0.4 MG/DL (ref 0–1.2)
BUN SERPL-MCNC: 14 MG/DL (ref 6–20)
BUN/CREAT SERPL: 22.2 (ref 7–25)
CALCIUM SPEC-SCNC: 9.7 MG/DL (ref 8.6–10.5)
CHLORIDE SERPL-SCNC: 103 MMOL/L (ref 98–107)
CHOLEST SERPL-MCNC: 158 MG/DL (ref 0–200)
CO2 SERPL-SCNC: 27.8 MMOL/L (ref 22–29)
CREAT SERPL-MCNC: 0.63 MG/DL (ref 0.57–1)
EGFRCR SERPLBLD CKD-EPI 2021: 103.6 ML/MIN/1.73
GLOBULIN UR ELPH-MCNC: 2.4 GM/DL
GLUCOSE SERPL-MCNC: 84 MG/DL (ref 65–99)
HDLC SERPL-MCNC: 42 MG/DL (ref 40–60)
LDLC SERPL CALC-MCNC: 88 MG/DL (ref 0–100)
LDLC/HDLC SERPL: 2 {RATIO}
POTASSIUM SERPL-SCNC: 4.5 MMOL/L (ref 3.5–5.2)
PROT SERPL-MCNC: 7.2 G/DL (ref 6–8.5)
SODIUM SERPL-SCNC: 141 MMOL/L (ref 136–145)
TRIGL SERPL-MCNC: 159 MG/DL (ref 0–150)
VLDLC SERPL-MCNC: 28 MG/DL (ref 5–40)

## 2022-04-20 PROCEDURE — 73560 X-RAY EXAM OF KNEE 1 OR 2: CPT

## 2022-04-20 PROCEDURE — 80061 LIPID PANEL: CPT | Performed by: FAMILY MEDICINE

## 2022-04-20 PROCEDURE — 99213 OFFICE O/P EST LOW 20 MIN: CPT | Performed by: FAMILY MEDICINE

## 2022-04-20 PROCEDURE — 80053 COMPREHEN METABOLIC PANEL: CPT | Performed by: FAMILY MEDICINE

## 2022-04-20 PROCEDURE — 36415 COLL VENOUS BLD VENIPUNCTURE: CPT | Performed by: FAMILY MEDICINE

## 2022-04-20 RX ORDER — MELOXICAM 15 MG/1
15 TABLET ORAL DAILY
COMMUNITY
End: 2023-01-07 | Stop reason: SDUPTHER

## 2022-04-20 RX ORDER — METHYLPREDNISOLONE 4 MG/1
TABLET ORAL
Qty: 21 TABLET | Refills: 0 | Status: SHIPPED | OUTPATIENT
Start: 2022-04-20 | End: 2022-12-07

## 2022-04-20 NOTE — PROGRESS NOTES
"Subjective   Elodia Alicea is a 56 y.o. female.     History of Present Illness   The patient consents to being recorded using BE.    The patient presents today for a follow-up on her hypertension and hyperlipidemia and is also complaining of right knee pain for the last 3 weeks.     Right knee pain  The patient states she injured her right knee at work when she was walking on a walkway and fell down twice after unloading a truck. She reports she felt a \"little twist\" in her knee that she thought would resolve over time, but it has continued to be symptomatic. She localizes the pain to the medial aspect of the right knee, and she reports the pain has increased. The patient is taking ibuprofen every 4 hours, and applying ice and Biofreeze to the knee for relief. She states she did not report the incident to workers' compensation, and she states she has spoken to her manager about the incident and was told there was nothing that could be done. She states she does not care about reporting the incident and states she needs pain relief at this time. The patient reports she is not in severe pain today and states she has not yet walked a long distance. She notes she walks from 3 to 5 miles in 6 hours at work. 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 The patient states she is uncertain of the nature of her injury, and she would like to undergo an ultrasound to determine a diagnosis. She reports the knee swelled shortly after the injury, and she denies it is swollen at this time. She states compression braces causes the knee to swell more. The patient reports knee pain while driving and while lying in bed, unless the knee is resting in a straight position. She states she would like to take a couple of days off of work to see if her symptoms will improve after rest. She states she would like to consult Dr. Tosha York in " "orthopedics in [place], [place], who she has previously seen for her shoulder.    Health maintenance  The patient reports she is hard of hearing. She denies chest pain, difficulty breathing, or headache. The patient states she has an appointment for a Pap smear on 2022. She states she will undergo Cologuard testing. She states she will not undergo the \"other\" test because she does not feel like she is at risk.    The following portions of the patient's history were reviewed and updated as appropriate: allergies, current medications, past family history, past medical history, past social history, past surgical history, and problem list.  Past Medical History:   Diagnosis Date   • Anxiety    • Hyperlipidemia    • Hypertension      Past Surgical History:   Procedure Laterality Date   • BREAST SURGERY     •  SECTION     • REDUCTION MAMMAPLASTY       Family History   Problem Relation Age of Onset   • Diabetes Mother    • Hypertension Mother    • Diabetes Father    • Hypertension Father    • Hyperlipidemia Father      Social History     Socioeconomic History   • Marital status:    Tobacco Use   • Smoking status: Former Smoker     Packs/day: 0.50     Types: Cigarettes     Quit date: 3/31/2020     Years since quittin.0   • Smokeless tobacco: Former User   Substance and Sexual Activity   • Alcohol use: Yes     Comment: once every six month    • Drug use: Never   • Sexual activity: Defer         Current Outpatient Medications:   •  atorvastatin (LIPITOR) 20 MG tablet, Take 1 tablet by mouth Daily., Disp: 90 tablet, Rfl: 2  •  citalopram (CeleXA) 10 MG tablet, Take 1 tablet by mouth Daily., Disp: 90 tablet, Rfl: 0  •  lisinopril (PRINIVIL,ZESTRIL) 20 MG tablet, Take 1 tablet by mouth Daily., Disp: 90 tablet, Rfl: 3  •  meloxicam (MOBIC) 15 MG tablet, Take 15 mg by mouth Daily., Disp: , Rfl:   •  zolpidem (AMBIEN) 10 MG tablet, Take 1 tablet by mouth At Night As Needed for Sleep., Disp: 30 tablet, Rfl: " "1  •  EPINEPHrine (EPIPEN) 0.3 MG/0.3ML solution auto-injector injection, INJECT INTO THIGH AND HOLD 10 SEC FOR ALLERGIC REACTION, Disp: 1 each, Rfl: 11    Review of Systems   Review of systems was performed, and pertinent findings are noted in the HPI.    /90 (BP Location: Left arm, Patient Position: Sitting, Cuff Size: Large Adult)   Pulse 85   Temp 97.8 °F (36.6 °C) (Temporal)   Ht 165.1 cm (65\")   Wt 88.9 kg (196 lb)   SpO2 98%   Breastfeeding No   BMI 32.62 kg/m²       Objective   Physical Exam  Vitals and nursing note reviewed.   Constitutional:       General: She is not in acute distress.     Appearance: She is well-developed and well-groomed. She is obese.   HENT:      Head: Normocephalic and atraumatic.      Right Ear: Decreased hearing noted.      Left Ear: Decreased hearing noted.   Neck:      Thyroid: No thyromegaly.   Cardiovascular:      Rate and Rhythm: Normal rate and regular rhythm.      Heart sounds: Normal heart sounds. No murmur heard.    No friction rub. No gallop.   Pulmonary:      Effort: Pulmonary effort is normal. No respiratory distress.      Breath sounds: Normal breath sounds. No wheezing or rales.   Musculoskeletal:      Cervical back: Neck supple.      Right knee: Crepitus present. No swelling, erythema or ecchymosis. Decreased range of motion. Tenderness present over the medial joint line. No LCL laxity, MCL laxity, ACL laxity or PCL laxity.      Instability Tests: Anterior drawer test negative. Posterior drawer test negative.      Right lower leg: No edema.      Left lower leg: No edema.   Lymphadenopathy:      Cervical: No cervical adenopathy.   Skin:     General: Skin is warm and dry.   Neurological:      Mental Status: She is alert.   Psychiatric:         Mood and Affect: Mood normal.         Behavior: Behavior is cooperative.           Assessment/Plan   Problems Addressed this Visit           Hypertension       - She will continue the lisinopril 20 mg.         " Hyperlipidemia       - She will stay on the atorvastatin 20 mg.         Right knee pain:       - She will continue the meloxicam and I put her on a Medrol Dosepak.       - I have sent her for an x-ray of her knee.       - I put her out of work until Monday 04/25/2022 for knee pain.         Health maintenance       - She was counseled on the need for weight loss.       - She already has an appointment scheduled for a physical later this year.       - She is due a Cologuard for colon cancer screening, so I have that ordered.     Diagnoses    None.                 Transcribed from ambient dictation for Heena Villagomez MD by Miriam Alberto.  04/20/22   11:00 EDT    Patient verbalized consent to the visit recording.

## 2022-04-29 ENCOUNTER — OFFICE VISIT (OUTPATIENT)
Dept: ORTHOPEDIC SURGERY | Facility: CLINIC | Age: 57
End: 2022-04-29

## 2022-04-29 VITALS — TEMPERATURE: 97.7 F | WEIGHT: 198 LBS | HEIGHT: 65 IN | BODY MASS INDEX: 32.99 KG/M2

## 2022-04-29 DIAGNOSIS — S83.241A ACUTE MEDIAL MENISCUS TEAR OF RIGHT KNEE, INITIAL ENCOUNTER: Primary | ICD-10-CM

## 2022-04-29 PROCEDURE — 99213 OFFICE O/P EST LOW 20 MIN: CPT | Performed by: ORTHOPAEDIC SURGERY

## 2022-04-29 PROCEDURE — 20610 DRAIN/INJ JOINT/BURSA W/O US: CPT | Performed by: ORTHOPAEDIC SURGERY

## 2022-04-29 RX ORDER — METHYLPREDNISOLONE ACETATE 80 MG/ML
80 INJECTION, SUSPENSION INTRA-ARTICULAR; INTRALESIONAL; INTRAMUSCULAR; SOFT TISSUE
Status: COMPLETED | OUTPATIENT
Start: 2022-04-29 | End: 2022-04-29

## 2022-04-29 RX ORDER — LIDOCAINE HYDROCHLORIDE 20 MG/ML
4 INJECTION, SOLUTION EPIDURAL; INFILTRATION; INTRACAUDAL; PERINEURAL
Status: COMPLETED | OUTPATIENT
Start: 2022-04-29 | End: 2022-04-29

## 2022-04-29 RX ADMIN — METHYLPREDNISOLONE ACETATE 80 MG: 80 INJECTION, SUSPENSION INTRA-ARTICULAR; INTRALESIONAL; INTRAMUSCULAR; SOFT TISSUE at 13:58

## 2022-04-29 RX ADMIN — LIDOCAINE HYDROCHLORIDE 4 ML: 20 INJECTION, SOLUTION EPIDURAL; INFILTRATION; INTRACAUDAL; PERINEURAL at 13:58

## 2022-04-29 NOTE — PROGRESS NOTES
New Right Knee      Patient: Elodia Alicea        YOB: 1965    Medical Record Number: [id number]        Chief Complaints: Right knee pain      History of Present Illness: This is a 56-year-old female who injured her right knee at the end of March she was unloading a semi-.  The semihad pulled in and parked illegally but she still had unloaded.  There was a step-off between the edge of the ramp and the concrete her foot got caught and twisted this happened twice and she had to severe pain at that time her pain is been persistent she does walk many miles at work she did tell her supervisor that day that she had hurt her knee.  She was hoping it would get better however when her symptoms persisted she asked them and they are not claiming this is work.  I disagree with this she was hurt at work she told her supervisor she is going to speak with them about this again at work.  Her current symptoms are moderate to severe 7 out of 10 stabbing throbbing aching with swelling worse with activity somewhat better with rest her past medical history is remarkable for hyperlipidemia hypertension anxiety      Allergies:   Allergies   Allergen Reactions   • Percocet [Oxycodone-Acetaminophen] Itching     Feels like bugs crawling on her   • Codeine Itching     unknown   • Pine Nut Unknown - High Severity       Medications:   Home Medications:  Current Outpatient Medications on File Prior to Visit   Medication Sig   • atorvastatin (LIPITOR) 20 MG tablet Take 1 tablet by mouth Daily.   • citalopram (CeleXA) 10 MG tablet Take 1 tablet by mouth Daily.   • EPINEPHrine (EPIPEN) 0.3 MG/0.3ML solution auto-injector injection INJECT INTO THIGH AND HOLD 10 SEC FOR ALLERGIC REACTION   • lisinopril (PRINIVIL,ZESTRIL) 20 MG tablet Take 1 tablet by mouth Daily.   • meloxicam (MOBIC) 15 MG tablet Take 15 mg by mouth Daily.   • methylPREDNISolone (MEDROL) 4 MG dose pack Take as directed on package instructions.   • zolpidem (AMBIEN)  10 MG tablet Take 1 tablet by mouth At Night As Needed for Sleep.     No current facility-administered medications on file prior to visit.     Current Medications:  Scheduled Meds:  Continuous Infusions:No current facility-administered medications for this visit.    PRN Meds:.    Past Medical History:   Diagnosis Date   • Anxiety    • Hyperlipidemia    • Hypertension         Past Surgical History:   Procedure Laterality Date   • BREAST SURGERY     •  SECTION     • REDUCTION MAMMAPLASTY          Social History     Occupational History   • Not on file   Tobacco Use   • Smoking status: Former Smoker     Packs/day: 0.50     Types: Cigarettes     Quit date: 3/31/2020     Years since quittin.0   • Smokeless tobacco: Former User   Substance and Sexual Activity   • Alcohol use: Yes     Comment: once every six month    • Drug use: Never   • Sexual activity: Defer      Social History     Social History Narrative   • Not on file        Family History   Problem Relation Age of Onset   • Diabetes Mother    • Hypertension Mother    • Diabetes Father    • Hypertension Father    • Hyperlipidemia Father              Review of Systems: 14 point review of systems more for the right knee pain only the remainder negative per the patient    Review of Systems      Physical Exam: 56 y.o. female  General Appearance:    Alert, cooperative, in no acute distress                 There were no vitals filed for this visit.   Patient is alert and read ×3 no acute distress appears her above-listed at height weight and age.  Affect is normal respiratory rate is normal unlabored. Heart rate regular rate rhythm, sclera, dentition and hearing are normal for the purpose of this exam.    Ortho Exam Physical exam of the right  knee reveals no effusion no redness.  The patient does have tenderness about the medial l joint line.  No tenderness about the lateral l joint line.  A negative bounce home and a positive l medial Hood.    Patient  has a stable ligamentous exam.  The patient has a negative Lachman and negative anterior drawer and a negative pivot shift.  Quads are reasonable and symmetric bilaterally.  Calf is soft and nontender.  There is no overlying skin changes no lymphedema lymphadenopathy.  Patient has good hip range of motion full symmetric and asymptomatic and a normal ankle exam.  She has good distal pulses and sensation distally is intact      Large Joint Arthrocentesis: R knee  Date/Time: 4/29/2022 1:58 PM  Consent given by: patient  Site marked: site marked  Timeout: Immediately prior to procedure a time out was called to verify the correct patient, procedure, equipment, support staff and site/side marked as required   Supporting Documentation  Indications: pain and joint swelling   Procedure Details  Location: knee - R knee  Preparation: Patient was prepped and draped in the usual sterile fashion  Needle size: 22 G  Approach: anterolateral  Medications administered: 80 mg methylPREDNISolone acetate 80 MG/ML; 4 mL lidocaine PF 2% 2 %  Patient tolerance: patient tolerated the procedure well with no immediate complications                Radiology:   AP, lateral view were ordered/reviewed to evauate knee pain these are in epic these are normal I see no evidence of any acute bony pathology  Imaging Results (Most Recent)     None        Assessment/Plan: Right knee pain I am concerned about a meniscus tear the mechanism her exam is quite remarkable for this.  We will try more conservative things we talked about strengthening stretching exercises would like to inject her today.  She is supposed to work this weekend and wants to work however I think it is in her best interest to inject her today and have her take the weekend off.  I will see her back in 3 weeks if she is not better we will get an MRI  Cortisone Injection. See procedure note.  Cortisone Injection for DIAGNOSTIC and THERAPUTIC purposes.

## 2022-05-25 DIAGNOSIS — G47.09 OTHER INSOMNIA: ICD-10-CM

## 2022-05-25 RX ORDER — ZOLPIDEM TARTRATE 10 MG/1
10 TABLET ORAL NIGHTLY PRN
Qty: 30 TABLET | Refills: 1 | Status: SHIPPED | OUTPATIENT
Start: 2022-05-25 | End: 2022-07-20 | Stop reason: SDUPTHER

## 2022-06-16 RX ORDER — LISINOPRIL 20 MG/1
20 TABLET ORAL DAILY
Qty: 90 TABLET | Refills: 3 | Status: SHIPPED | OUTPATIENT
Start: 2022-06-16

## 2022-06-16 RX ORDER — CITALOPRAM 10 MG/1
10 TABLET ORAL DAILY
Qty: 90 TABLET | Refills: 0 | Status: SHIPPED | OUTPATIENT
Start: 2022-06-16 | End: 2022-09-15 | Stop reason: SDUPTHER

## 2022-06-16 RX ORDER — ATORVASTATIN CALCIUM 20 MG/1
20 TABLET, FILM COATED ORAL DAILY
Qty: 90 TABLET | Refills: 2 | Status: SHIPPED | OUTPATIENT
Start: 2022-06-16 | End: 2023-03-30 | Stop reason: SDUPTHER

## 2022-07-20 DIAGNOSIS — G47.09 OTHER INSOMNIA: ICD-10-CM

## 2022-07-20 RX ORDER — ZOLPIDEM TARTRATE 10 MG/1
10 TABLET ORAL NIGHTLY PRN
Qty: 30 TABLET | Refills: 1 | Status: SHIPPED | OUTPATIENT
Start: 2022-07-20 | End: 2022-09-18 | Stop reason: SDUPTHER

## 2022-09-15 RX ORDER — CITALOPRAM 10 MG/1
10 TABLET ORAL DAILY
Qty: 90 TABLET | Refills: 0 | Status: CANCELLED | OUTPATIENT
Start: 2022-09-15

## 2022-09-16 RX ORDER — CITALOPRAM 10 MG/1
10 TABLET ORAL DAILY
Qty: 90 TABLET | Refills: 1 | Status: SHIPPED | OUTPATIENT
Start: 2022-09-16 | End: 2023-03-30 | Stop reason: SDUPTHER

## 2022-09-18 DIAGNOSIS — G47.09 OTHER INSOMNIA: ICD-10-CM

## 2022-09-18 RX ORDER — ZOLPIDEM TARTRATE 10 MG/1
10 TABLET ORAL NIGHTLY PRN
Qty: 30 TABLET | Refills: 1 | Status: CANCELLED | OUTPATIENT
Start: 2022-09-18

## 2022-09-19 DIAGNOSIS — G47.09 OTHER INSOMNIA: ICD-10-CM

## 2022-09-19 RX ORDER — ZOLPIDEM TARTRATE 10 MG/1
10 TABLET ORAL NIGHTLY PRN
Qty: 30 TABLET | Refills: 1 | Status: SHIPPED | OUTPATIENT
Start: 2022-09-19 | End: 2022-11-14 | Stop reason: SDUPTHER

## 2022-11-14 DIAGNOSIS — G47.09 OTHER INSOMNIA: ICD-10-CM

## 2022-11-14 RX ORDER — ZOLPIDEM TARTRATE 10 MG/1
10 TABLET ORAL NIGHTLY PRN
Qty: 30 TABLET | Refills: 1 | Status: SHIPPED | OUTPATIENT
Start: 2022-11-14 | End: 2023-01-12 | Stop reason: SDUPTHER

## 2022-12-01 ENCOUNTER — TELEPHONE (OUTPATIENT)
Dept: FAMILY MEDICINE CLINIC | Facility: CLINIC | Age: 57
End: 2022-12-01

## 2022-12-01 DIAGNOSIS — I10 PRIMARY HYPERTENSION: ICD-10-CM

## 2022-12-01 DIAGNOSIS — E78.2 MIXED HYPERLIPIDEMIA: Primary | ICD-10-CM

## 2022-12-01 DIAGNOSIS — E05.90 HYPERTHYROIDISM: ICD-10-CM

## 2022-12-01 NOTE — TELEPHONE ENCOUNTER
Caller: Elodia Alicea    Relationship: Self    Best call back number: [phone]    What orders are you requesting (i.e. lab or imaging): LABS    In what timeframe would the patient need to come in: PRIOR TO 12/7/22 APPOINTMENT    Where will you receive your lab/imaging services: [facility]    Additional notes: PATIENT WOULD LIKE LAB ORDERS PUT AT [facility] PRIOR TO HER 12/7/22 APPOINTMENT WITH DR. FAREED WORTHY.    PLEASE ADVISE

## 2022-12-05 ENCOUNTER — LAB (OUTPATIENT)
Dept: LAB | Facility: HOSPITAL | Age: 57
End: 2022-12-05

## 2022-12-05 DIAGNOSIS — E78.2 MIXED HYPERLIPIDEMIA: ICD-10-CM

## 2022-12-05 DIAGNOSIS — E05.90 HYPERTHYROIDISM: ICD-10-CM

## 2022-12-05 DIAGNOSIS — I10 PRIMARY HYPERTENSION: ICD-10-CM

## 2022-12-05 LAB
ALBUMIN SERPL-MCNC: 4.7 G/DL (ref 3.5–5.2)
ALBUMIN/GLOB SERPL: 1.8 G/DL
ALP SERPL-CCNC: 77 U/L (ref 39–117)
ALT SERPL W P-5'-P-CCNC: 17 U/L (ref 1–33)
ANION GAP SERPL CALCULATED.3IONS-SCNC: 8 MMOL/L (ref 5–15)
AST SERPL-CCNC: 16 U/L (ref 1–32)
BILIRUB SERPL-MCNC: 0.4 MG/DL (ref 0–1.2)
BUN SERPL-MCNC: 14 MG/DL (ref 6–20)
BUN/CREAT SERPL: 19.2 (ref 7–25)
CALCIUM SPEC-SCNC: 9.9 MG/DL (ref 8.6–10.5)
CHLORIDE SERPL-SCNC: 103 MMOL/L (ref 98–107)
CHOLEST SERPL-MCNC: 169 MG/DL (ref 0–200)
CO2 SERPL-SCNC: 28 MMOL/L (ref 22–29)
CREAT SERPL-MCNC: 0.73 MG/DL (ref 0.57–1)
EGFRCR SERPLBLD CKD-EPI 2021: 95.5 ML/MIN/1.73
GLOBULIN UR ELPH-MCNC: 2.6 GM/DL
GLUCOSE SERPL-MCNC: 108 MG/DL (ref 65–99)
HDLC SERPL-MCNC: 46 MG/DL (ref 40–60)
LDLC SERPL CALC-MCNC: 90 MG/DL (ref 0–100)
LDLC/HDLC SERPL: 1.83 {RATIO}
POTASSIUM SERPL-SCNC: 4.4 MMOL/L (ref 3.5–5.2)
PROT SERPL-MCNC: 7.3 G/DL (ref 6–8.5)
SODIUM SERPL-SCNC: 139 MMOL/L (ref 136–145)
TRIGL SERPL-MCNC: 195 MG/DL (ref 0–150)
TSH SERPL DL<=0.05 MIU/L-ACNC: 0.91 UIU/ML (ref 0.27–4.2)
VLDLC SERPL-MCNC: 33 MG/DL (ref 5–40)

## 2022-12-05 PROCEDURE — 36415 COLL VENOUS BLD VENIPUNCTURE: CPT

## 2022-12-05 PROCEDURE — 80061 LIPID PANEL: CPT

## 2022-12-05 PROCEDURE — 80053 COMPREHEN METABOLIC PANEL: CPT

## 2022-12-05 PROCEDURE — 84443 ASSAY THYROID STIM HORMONE: CPT

## 2022-12-07 ENCOUNTER — OFFICE VISIT (OUTPATIENT)
Dept: FAMILY MEDICINE CLINIC | Facility: CLINIC | Age: 57
End: 2022-12-07

## 2022-12-07 ENCOUNTER — LAB (OUTPATIENT)
Dept: FAMILY MEDICINE CLINIC | Facility: CLINIC | Age: 57
End: 2022-12-07

## 2022-12-07 VITALS
HEART RATE: 83 BPM | OXYGEN SATURATION: 96 % | WEIGHT: 196 LBS | SYSTOLIC BLOOD PRESSURE: 137 MMHG | BODY MASS INDEX: 32.65 KG/M2 | HEIGHT: 65 IN | DIASTOLIC BLOOD PRESSURE: 86 MMHG | TEMPERATURE: 97.5 F

## 2022-12-07 DIAGNOSIS — M25.50 ARTHRALGIA, UNSPECIFIED JOINT: ICD-10-CM

## 2022-12-07 DIAGNOSIS — Z12.11 SCREENING FOR COLON CANCER: ICD-10-CM

## 2022-12-07 DIAGNOSIS — Z12.31 BREAST CANCER SCREENING BY MAMMOGRAM: ICD-10-CM

## 2022-12-07 DIAGNOSIS — Z12.4 PAP SMEAR FOR CERVICAL CANCER SCREENING: ICD-10-CM

## 2022-12-07 DIAGNOSIS — Z00.00 PREVENTATIVE HEALTH CARE: ICD-10-CM

## 2022-12-07 DIAGNOSIS — Z00.00 PREVENTATIVE HEALTH CARE: Primary | ICD-10-CM

## 2022-12-07 PROCEDURE — 86235 NUCLEAR ANTIGEN ANTIBODY: CPT | Performed by: FAMILY MEDICINE

## 2022-12-07 PROCEDURE — 86225 DNA ANTIBODY NATIVE: CPT | Performed by: FAMILY MEDICINE

## 2022-12-07 PROCEDURE — 85652 RBC SED RATE AUTOMATED: CPT | Performed by: FAMILY MEDICINE

## 2022-12-07 PROCEDURE — 86431 RHEUMATOID FACTOR QUANT: CPT | Performed by: FAMILY MEDICINE

## 2022-12-07 PROCEDURE — 99213 OFFICE O/P EST LOW 20 MIN: CPT | Performed by: FAMILY MEDICINE

## 2022-12-07 PROCEDURE — 36415 COLL VENOUS BLD VENIPUNCTURE: CPT

## 2022-12-07 PROCEDURE — 99396 PREV VISIT EST AGE 40-64: CPT | Performed by: FAMILY MEDICINE

## 2022-12-07 PROCEDURE — 85025 COMPLETE CBC W/AUTO DIFF WBC: CPT | Performed by: FAMILY MEDICINE

## 2022-12-07 NOTE — PROGRESS NOTES
Subjective   Elodia Alicea is a 57 y.o. female.     History of Present Illness  Here for CPE and Pap smear  She is also due Cologuard and mammogram  Labs done prior to appt  Whole body aches - mostly knees and hips  Mattress is older than 10 years  No longer on meloxicam  No FH of autoimmune illnesses  Feels limited on activity as a result  No joint swelling  Always tired  Takes a nap daily  Skin lesions near right eye and on back  Hearing loss         The following portions of the patient's history were reviewed and updated as appropriate: allergies, current medications, past family history, past medical history, past social history, past surgical history, and problem list.  Past Medical History:   Diagnosis Date   • Anxiety    • Hyperlipidemia    • Hypertension      Past Surgical History:   Procedure Laterality Date   • BREAST SURGERY     •  SECTION     • REDUCTION MAMMAPLASTY       Family History   Problem Relation Age of Onset   • Diabetes Mother    • Hypertension Mother    • Diabetes Father    • Hypertension Father    • Hyperlipidemia Father      Social History     Socioeconomic History   • Marital status:    Tobacco Use   • Smoking status: Former     Packs/day: 0.50     Types: Cigarettes     Quit date: 3/31/2020     Years since quittin.7   • Smokeless tobacco: Former   Substance and Sexual Activity   • Alcohol use: Yes     Comment: once every six month    • Drug use: Never   • Sexual activity: Defer         Current Outpatient Medications:   •  atorvastatin (LIPITOR) 20 MG tablet, Take 1 tablet by mouth Daily., Disp: 90 tablet, Rfl: 2  •  citalopram (CeleXA) 10 MG tablet, Take 1 tablet by mouth Daily., Disp: 90 tablet, Rfl: 1  •  lisinopril (PRINIVIL,ZESTRIL) 20 MG tablet, Take 1 tablet by mouth Daily., Disp: 90 tablet, Rfl: 3  •  zolpidem (AMBIEN) 10 MG tablet, Take 1 tablet by mouth At Night As Needed for Sleep., Disp: 30 tablet, Rfl: 1  •  EPINEPHrine (EPIPEN) 0.3 MG/0.3ML solution  "auto-injector injection, INJECT INTO THIGH AND HOLD 10 SEC FOR ALLERGIC REACTION, Disp: 1 each, Rfl: 11  •  meloxicam (MOBIC) 15 MG tablet, Take 15 mg by mouth Daily., Disp: , Rfl:     Review of Systems   Constitutional: Positive for fatigue.   HENT: Positive for hearing loss. Negative for ear pain.    Respiratory: Positive for shortness of breath. Negative for cough, chest tightness and wheezing.    Cardiovascular: Negative.    Gastrointestinal: Negative.    Musculoskeletal: Positive for arthralgias. Negative for joint swelling.   Skin: Positive for skin lesions.   Neurological: Negative.      /86 (BP Location: Left arm, Patient Position: Sitting, Cuff Size: Large Adult)   Pulse 83   Temp 97.5 °F (36.4 °C) (Temporal)   Ht 165.1 cm (65\")   Wt 88.9 kg (196 lb)   SpO2 96%   BMI 32.62 kg/m²       Objective   Physical Exam  Vitals and nursing note reviewed. Exam conducted with a chaperone present.   Constitutional:       Appearance: Normal appearance. She is well-developed and well-groomed. She is obese.   HENT:      Head: Normocephalic and atraumatic.      Right Ear: Tympanic membrane, ear canal and external ear normal.      Left Ear: Tympanic membrane, ear canal and external ear normal.      Nose: Nose normal.      Mouth/Throat:      Mouth: Mucous membranes are moist.      Pharynx: Oropharynx is clear.   Eyes:      Extraocular Movements: Extraocular movements intact.      Conjunctiva/sclera: Conjunctivae normal.      Pupils: Pupils are equal, round, and reactive to light.   Neck:      Thyroid: No thyromegaly.      Vascular: No carotid bruit.   Cardiovascular:      Rate and Rhythm: Normal rate and regular rhythm.      Pulses: Normal pulses.      Heart sounds: Normal heart sounds.   Pulmonary:      Effort: Pulmonary effort is normal.      Breath sounds: Normal breath sounds.   Chest:   Breasts:     Right: Normal.      Left: Normal.   Abdominal:      General: Abdomen is flat. Bowel sounds are normal.      " Palpations: Abdomen is soft. There is no hepatomegaly, splenomegaly or mass.      Tenderness: There is no abdominal tenderness.      Hernia: No hernia is present. There is no hernia in the left inguinal area or right inguinal area.   Genitourinary:     General: Normal vulva.      Exam position: Lithotomy position.      Labia:         Right: No rash, tenderness or lesion.         Left: No rash, tenderness or lesion.       Vagina: Normal. No vaginal discharge.      Cervix: Normal.      Uterus: Normal.       Adnexa: Right adnexa normal and left adnexa normal.      Rectum: Normal. Guaiac result negative.   Musculoskeletal:      Cervical back: Normal range of motion and neck supple.      Right lower leg: No edema.      Left lower leg: No edema.      Comments: No joint swelling  No vertebral tenderness   Lymphadenopathy:      Cervical: No cervical adenopathy.      Upper Body:      Right upper body: No supraclavicular or axillary adenopathy.      Left upper body: No supraclavicular or axillary adenopathy.      Lower Body: No right inguinal adenopathy. No left inguinal adenopathy.   Skin:     General: Skin is warm and dry.      Findings: No lesion or rash.          Neurological:      General: No focal deficit present.      Mental Status: She is alert.      Motor: Motor function is intact.      Deep Tendon Reflexes: Reflexes are normal and symmetric.   Psychiatric:         Attention and Perception: Attention normal.         Mood and Affect: Mood normal.         Behavior: Behavior is cooperative.       Lab Results   Component Value Date    GLUCOSE 108 (H) 12/05/2022    BUN 14 12/05/2022    CREATININE 0.73 12/05/2022    EGFRIFNONA 103 03/24/2021    BCR 19.2 12/05/2022    K 4.4 12/05/2022    CO2 28.0 12/05/2022    CALCIUM 9.9 12/05/2022    ALBUMIN 4.70 12/05/2022    LABIL2 1.5 12/15/2017    AST 16 12/05/2022    ALT 17 12/05/2022     Lab Results   Component Value Date    CHOL 169 12/05/2022    TRIG 195 (H) 12/05/2022    HDL 46  12/05/2022    LDL 90 12/05/2022     Lab Results   Component Value Date    TSH 0.913 12/05/2022       Assessment & Plan   Problems Addressed this Visit        Health Encounters    Preventative health care - Primary   Other Visit Diagnoses     Arthralgia, unspecified joint        Breast cancer screening by mammogram        Relevant Orders    Mammo Screening Digital Tomosynthesis Bilateral With CAD    Pap smear for cervical cancer screening        Screening for colon cancer        Relevant Orders    Occult Blood, Fecal By Immunoassay - Stool, Per Rectum      Diagnoses       Codes Comments    Preventative health care    -  Primary ICD-10-CM: Z00.00  ICD-9-CM: V70.0     Arthralgia, unspecified joint     ICD-10-CM: M25.50  ICD-9-CM: 719.40     Breast cancer screening by mammogram     ICD-10-CM: Z12.31  ICD-9-CM: V76.12     Pap smear for cervical cancer screening     ICD-10-CM: Z12.4  ICD-9-CM: V76.2     Screening for colon cancer     ICD-10-CM: Z12.11  ICD-9-CM: V76.51         She was counseled on the need for weight loss.  Her labs reviewed with her.  Pap smear was obtained and sent to pathology for evaluation.  Mammogram was ordered.  Colonoscopy was ordered.  Labs for further evaluation of arthralgias and fatigue were ordered

## 2022-12-08 LAB
BASOPHILS # BLD AUTO: 0.12 10*3/MM3 (ref 0–0.2)
BASOPHILS NFR BLD AUTO: 1.4 % (ref 0–1.5)
CENTROMERE B AB SER-ACNC: <0.2 AI (ref 0–0.9)
CHROMATIN AB SERPL-ACNC: <0.2 AI (ref 0–0.9)
CHROMATIN AB SERPL-ACNC: <10 IU/ML (ref 0–14)
DEPRECATED RDW RBC AUTO: 42.4 FL (ref 37–54)
DSDNA AB SER-ACNC: <1 IU/ML (ref 0–9)
ENA JO1 AB SER-ACNC: <0.2 AI (ref 0–0.9)
ENA RNP AB SER-ACNC: <0.2 AI (ref 0–0.9)
ENA SCL70 AB SER-ACNC: <0.2 AI (ref 0–0.9)
ENA SM AB SER-ACNC: <0.2 AI (ref 0–0.9)
ENA SS-A AB SER-ACNC: <0.2 AI (ref 0–0.9)
ENA SS-B AB SER-ACNC: <0.2 AI (ref 0–0.9)
EOSINOPHIL # BLD AUTO: 0.55 10*3/MM3 (ref 0–0.4)
EOSINOPHIL NFR BLD AUTO: 6.5 % (ref 0.3–6.2)
ERYTHROCYTE [DISTWIDTH] IN BLOOD BY AUTOMATED COUNT: 12.7 % (ref 12.3–15.4)
ERYTHROCYTE [SEDIMENTATION RATE] IN BLOOD: 7 MM/HR (ref 0–30)
HCT VFR BLD AUTO: 40.9 % (ref 34–46.6)
HGB BLD-MCNC: 13.2 G/DL (ref 12–15.9)
IMM GRANULOCYTES # BLD AUTO: 0.02 10*3/MM3 (ref 0–0.05)
IMM GRANULOCYTES NFR BLD AUTO: 0.2 % (ref 0–0.5)
LYMPHOCYTES # BLD AUTO: 2.68 10*3/MM3 (ref 0.7–3.1)
LYMPHOCYTES NFR BLD AUTO: 31.5 % (ref 19.6–45.3)
Lab: NORMAL
MCH RBC QN AUTO: 29.3 PG (ref 26.6–33)
MCHC RBC AUTO-ENTMCNC: 32.3 G/DL (ref 31.5–35.7)
MCV RBC AUTO: 90.9 FL (ref 79–97)
MONOCYTES # BLD AUTO: 0.75 10*3/MM3 (ref 0.1–0.9)
MONOCYTES NFR BLD AUTO: 8.8 % (ref 5–12)
NEUTROPHILS NFR BLD AUTO: 4.4 10*3/MM3 (ref 1.7–7)
NEUTROPHILS NFR BLD AUTO: 51.6 % (ref 42.7–76)
NRBC BLD AUTO-RTO: 0 /100 WBC (ref 0–0.2)
PLATELET # BLD AUTO: 252 10*3/MM3 (ref 140–450)
PMV BLD AUTO: 11.4 FL (ref 6–12)
RBC # BLD AUTO: 4.5 10*6/MM3 (ref 3.77–5.28)
WBC NRBC COR # BLD: 8.52 10*3/MM3 (ref 3.4–10.8)

## 2022-12-15 LAB
AGE GDLN ACOG TESTING: NORMAL
CYTOLOGIST CVX/VAG CYTO: NORMAL
CYTOLOGY CVX/VAG DOC CYTO: NORMAL
CYTOLOGY CVX/VAG DOC THIN PREP: NORMAL
DX ICD CODE: NORMAL
HIV 1 & 2 AB SER-IMP: NORMAL
HPV GENOTYPE REFLEX: NORMAL
HPV I/H RISK 4 DNA CVX QL PROBE+SIG AMP: NEGATIVE
OTHER STN SPEC: NORMAL
STAT OF ADQ CVX/VAG CYTO-IMP: NORMAL

## 2023-01-08 RX ORDER — MELOXICAM 15 MG/1
15 TABLET ORAL DAILY
Qty: 90 TABLET | Refills: 1 | Status: SHIPPED | OUTPATIENT
Start: 2023-01-08

## 2023-01-12 DIAGNOSIS — G47.09 OTHER INSOMNIA: ICD-10-CM

## 2023-01-13 RX ORDER — ZOLPIDEM TARTRATE 10 MG/1
10 TABLET ORAL NIGHTLY PRN
Qty: 30 TABLET | Refills: 1 | Status: SHIPPED | OUTPATIENT
Start: 2023-01-13 | End: 2023-03-13 | Stop reason: SDUPTHER

## 2023-03-13 DIAGNOSIS — G47.09 OTHER INSOMNIA: ICD-10-CM

## 2023-03-14 RX ORDER — ZOLPIDEM TARTRATE 10 MG/1
10 TABLET ORAL NIGHTLY PRN
Qty: 30 TABLET | Refills: 1 | Status: SHIPPED | OUTPATIENT
Start: 2023-03-14

## 2023-03-30 RX ORDER — CITALOPRAM 10 MG/1
10 TABLET ORAL DAILY
Qty: 90 TABLET | Refills: 1 | Status: SHIPPED | OUTPATIENT
Start: 2023-03-30

## 2023-03-30 RX ORDER — ATORVASTATIN CALCIUM 20 MG/1
20 TABLET, FILM COATED ORAL DAILY
Qty: 90 TABLET | Refills: 1 | Status: SHIPPED | OUTPATIENT
Start: 2023-03-30

## 2023-05-10 DIAGNOSIS — G47.09 OTHER INSOMNIA: ICD-10-CM

## 2023-05-10 RX ORDER — ZOLPIDEM TARTRATE 10 MG/1
10 TABLET ORAL NIGHTLY PRN
Qty: 30 TABLET | Refills: 1 | Status: SHIPPED | OUTPATIENT
Start: 2023-05-10

## 2023-09-06 DIAGNOSIS — G47.09 OTHER INSOMNIA: ICD-10-CM

## 2023-09-06 RX ORDER — ZOLPIDEM TARTRATE 10 MG/1
10 TABLET ORAL NIGHTLY PRN
Qty: 30 TABLET | Refills: 1 | Status: CANCELLED | OUTPATIENT
Start: 2023-09-06

## 2023-09-07 DIAGNOSIS — G47.09 OTHER INSOMNIA: ICD-10-CM

## 2023-09-07 RX ORDER — ZOLPIDEM TARTRATE 10 MG/1
10 TABLET ORAL NIGHTLY PRN
Qty: 30 TABLET | Refills: 1 | Status: SHIPPED | OUTPATIENT
Start: 2023-09-07

## 2023-10-13 RX ORDER — CITALOPRAM HYDROBROMIDE 10 MG/1
10 TABLET ORAL DAILY
Qty: 90 TABLET | Refills: 1 | Status: CANCELLED | OUTPATIENT
Start: 2023-10-13

## 2023-10-13 RX ORDER — CITALOPRAM HYDROBROMIDE 10 MG/1
10 TABLET ORAL DAILY
Qty: 90 TABLET | Refills: 0 | Status: SHIPPED | OUTPATIENT
Start: 2023-10-13

## 2023-10-13 RX ORDER — ATORVASTATIN CALCIUM 20 MG/1
20 TABLET, FILM COATED ORAL DAILY
Qty: 90 TABLET | Refills: 0 | Status: SHIPPED | OUTPATIENT
Start: 2023-10-13

## 2023-10-13 RX ORDER — LISINOPRIL 20 MG/1
20 TABLET ORAL DAILY
Qty: 90 TABLET | Refills: 0 | Status: CANCELLED | OUTPATIENT
Start: 2023-10-13

## 2023-10-13 RX ORDER — LISINOPRIL 20 MG/1
20 TABLET ORAL DAILY
Qty: 90 TABLET | Refills: 0 | Status: SHIPPED | OUTPATIENT
Start: 2023-10-13

## 2023-10-13 RX ORDER — ATORVASTATIN CALCIUM 20 MG/1
20 TABLET, FILM COATED ORAL DAILY
Qty: 90 TABLET | Refills: 1 | Status: CANCELLED | OUTPATIENT
Start: 2023-10-13

## 2023-11-06 DIAGNOSIS — G47.09 OTHER INSOMNIA: ICD-10-CM

## 2023-11-06 RX ORDER — ZOLPIDEM TARTRATE 10 MG/1
10 TABLET ORAL NIGHTLY PRN
Qty: 30 TABLET | Refills: 0 | Status: SHIPPED | OUTPATIENT
Start: 2023-11-06

## 2023-12-01 DIAGNOSIS — G47.09 OTHER INSOMNIA: ICD-10-CM

## 2023-12-01 RX ORDER — ZOLPIDEM TARTRATE 10 MG/1
10 TABLET ORAL NIGHTLY PRN
Qty: 30 TABLET | Refills: 0 | Status: SHIPPED | OUTPATIENT
Start: 2023-12-01

## 2023-12-07 NOTE — PROGRESS NOTES
"Chief Complaint  Annual Exam    Subjective        Elodia Alicea presents to [facility]  History of Present Illness    Pt is here today for her yearly physical. She feels well overall.    She has had some random visual disturbances with pointed/sharp outer vision. This hasn't happened in 3-4 weeks. She denies headache, dizziness, etc. She has been to the eye doctor. Her BP has been controlled at home during these episodes.    She knows she needs to do her colonoscopy. She is going to try and get that done this year. BMs are normal.     She does go to the gym 2 times per week usually. She doesn't eat fast food. She could cut back on her sweet intake. She drinks a lot of water. She avoids alcohol. She is a former smoker.    She says her ambien really helps her sleep. She has not had any issues taking it.     Objective   Vital Signs:  /88 (BP Location: Left arm, Patient Position: Sitting, Cuff Size: Adult)   Pulse 78   Temp 97.7 °F (36.5 °C) (Tympanic)   Ht 168.9 cm (66.5\")   Wt 91.6 kg (202 lb)   SpO2 98%   BMI 32.12 kg/m²   Estimated body mass index is 32.12 kg/m² as calculated from the following:    Height as of this encounter: 168.9 cm (66.5\").    Weight as of this encounter: 91.6 kg (202 lb).          Review of Systems   Constitutional:  Negative for chills, fatigue and fever.   HENT:  Positive for hearing loss (chronic). Negative for congestion and sore throat.    Eyes:  Positive for visual disturbance. Negative for photophobia, pain and redness.   Respiratory:  Negative for cough, shortness of breath and wheezing.    Cardiovascular:  Negative for chest pain, palpitations and leg swelling.   Gastrointestinal:  Negative for abdominal pain, constipation, diarrhea, nausea and vomiting.   Endocrine: Negative for polydipsia, polyphagia and polyuria.   Genitourinary:  Negative for dysuria, flank pain and hematuria.   Musculoskeletal:  Negative for gait problem, joint " swelling and myalgias.   Skin:  Negative for color change, rash and wound.   Neurological:  Negative for dizziness, syncope, light-headedness and confusion.   Psychiatric/Behavioral:  Positive for sleep disturbance (controlled with ambien). Negative for agitation, behavioral problems and decreased concentration. The patient is not nervous/anxious.         Physical Exam  Vitals reviewed.   Constitutional:       General: She is not in acute distress.     Appearance: Normal appearance. She is not ill-appearing, toxic-appearing or diaphoretic.   HENT:      Head: Normocephalic and atraumatic.   Eyes:      Conjunctiva/sclera: Conjunctivae normal.      Pupils: Pupils are equal, round, and reactive to light.   Cardiovascular:      Rate and Rhythm: Normal rate and regular rhythm.      Pulses: Normal pulses.      Heart sounds: Normal heart sounds.   Pulmonary:      Effort: Pulmonary effort is normal. No respiratory distress.      Breath sounds: Normal breath sounds.   Abdominal:      General: Bowel sounds are normal. There is no distension.      Palpations: Abdomen is soft. There is no mass.      Tenderness: There is no abdominal tenderness. There is no right CVA tenderness, left CVA tenderness, guarding or rebound.      Hernia: No hernia is present.   Musculoskeletal:         General: Normal range of motion.      Cervical back: Normal range of motion and neck supple.      Right lower leg: No edema.      Left lower leg: No edema.   Lymphadenopathy:      Cervical: No cervical adenopathy.   Skin:     General: Skin is warm and dry.      Capillary Refill: Capillary refill takes less than 2 seconds.      Findings: No rash.   Neurological:      General: No focal deficit present.      Mental Status: She is alert and oriented to person, place, and time.   Psychiatric:         Mood and Affect: Mood normal.         Behavior: Behavior normal.         Thought Content: Thought content normal.         Judgment: Judgment normal.         Result Review :                Assessment and Plan   Diagnoses and all orders for this visit:    1. Health maintenance examination (Primary)    2. Primary hypertension  Comments:  -stable continue lisinopril 20mg daily  Orders:  -     Comprehensive metabolic panel; Future    3. Mixed hyperlipidemia  Comments:  -lipid panel  -continue lipitor 20 mg  Orders:  -     Comprehensive metabolic panel; Future  -     Lipid panel; Future    4. Other insomnia  Comments:  -well controlled with ambien    5. Breast cancer screening by mammogram  -     Mammo Screening Digital Tomosynthesis Bilateral With CAD; Future    6. Encounter for colorectal cancer screening  -     Ambulatory Referral For Screening Colonoscopy    7. Encounter for hepatitis C screening test for low risk patient  -     Hepatitis C antibody; Future    8. Vaccine counseling  Comments:  -pt refuses shingrix and covid vaccines             Follow Up   Return for Annual physical.  Patient was given instructions and counseling regarding her condition or for health maintenance advice. Please see specific information pulled into the AVS if appropriate.

## 2023-12-08 ENCOUNTER — OFFICE VISIT (OUTPATIENT)
Dept: FAMILY MEDICINE CLINIC | Facility: CLINIC | Age: 58
End: 2023-12-08
Payer: COMMERCIAL

## 2023-12-08 ENCOUNTER — LAB (OUTPATIENT)
Dept: FAMILY MEDICINE CLINIC | Facility: CLINIC | Age: 58
End: 2023-12-08
Payer: COMMERCIAL

## 2023-12-08 VITALS
WEIGHT: 202 LBS | DIASTOLIC BLOOD PRESSURE: 88 MMHG | OXYGEN SATURATION: 98 % | HEIGHT: 67 IN | TEMPERATURE: 97.7 F | BODY MASS INDEX: 31.71 KG/M2 | HEART RATE: 78 BPM | SYSTOLIC BLOOD PRESSURE: 133 MMHG

## 2023-12-08 DIAGNOSIS — Z12.31 BREAST CANCER SCREENING BY MAMMOGRAM: ICD-10-CM

## 2023-12-08 DIAGNOSIS — Z12.12 ENCOUNTER FOR COLORECTAL CANCER SCREENING: ICD-10-CM

## 2023-12-08 DIAGNOSIS — Z12.11 ENCOUNTER FOR COLORECTAL CANCER SCREENING: ICD-10-CM

## 2023-12-08 DIAGNOSIS — G47.09 OTHER INSOMNIA: ICD-10-CM

## 2023-12-08 DIAGNOSIS — E78.2 MIXED HYPERLIPIDEMIA: ICD-10-CM

## 2023-12-08 DIAGNOSIS — Z11.59 ENCOUNTER FOR HEPATITIS C SCREENING TEST FOR LOW RISK PATIENT: ICD-10-CM

## 2023-12-08 DIAGNOSIS — Z71.85 VACCINE COUNSELING: ICD-10-CM

## 2023-12-08 DIAGNOSIS — I10 PRIMARY HYPERTENSION: ICD-10-CM

## 2023-12-08 DIAGNOSIS — Z00.00 HEALTH MAINTENANCE EXAMINATION: Primary | ICD-10-CM

## 2023-12-08 LAB
ALBUMIN SERPL-MCNC: 4.8 G/DL (ref 3.5–5.2)
ALBUMIN/GLOB SERPL: 2.2 G/DL
ALP SERPL-CCNC: 71 U/L (ref 39–117)
ALT SERPL W P-5'-P-CCNC: 25 U/L (ref 1–33)
ANION GAP SERPL CALCULATED.3IONS-SCNC: 10 MMOL/L (ref 5–15)
AST SERPL-CCNC: 18 U/L (ref 1–32)
BILIRUB SERPL-MCNC: 0.4 MG/DL (ref 0–1.2)
BUN SERPL-MCNC: 10 MG/DL (ref 6–20)
BUN/CREAT SERPL: 14.9 (ref 7–25)
CALCIUM SPEC-SCNC: 9.8 MG/DL (ref 8.6–10.5)
CHLORIDE SERPL-SCNC: 103 MMOL/L (ref 98–107)
CHOLEST SERPL-MCNC: 173 MG/DL (ref 0–200)
CO2 SERPL-SCNC: 28 MMOL/L (ref 22–29)
CREAT SERPL-MCNC: 0.67 MG/DL (ref 0.57–1)
EGFRCR SERPLBLD CKD-EPI 2021: 100.8 ML/MIN/1.73
GLOBULIN UR ELPH-MCNC: 2.2 GM/DL
GLUCOSE SERPL-MCNC: 98 MG/DL (ref 65–99)
HCV AB SER DONR QL: NORMAL
HDLC SERPL-MCNC: 45 MG/DL (ref 40–60)
LDLC SERPL CALC-MCNC: 96 MG/DL (ref 0–100)
LDLC/HDLC SERPL: 2 {RATIO}
POTASSIUM SERPL-SCNC: 4.5 MMOL/L (ref 3.5–5.2)
PROT SERPL-MCNC: 7 G/DL (ref 6–8.5)
SODIUM SERPL-SCNC: 141 MMOL/L (ref 136–145)
TRIGL SERPL-MCNC: 189 MG/DL (ref 0–150)
VLDLC SERPL-MCNC: 32 MG/DL (ref 5–40)

## 2023-12-08 PROCEDURE — 80053 COMPREHEN METABOLIC PANEL: CPT

## 2023-12-08 PROCEDURE — 80061 LIPID PANEL: CPT

## 2023-12-08 PROCEDURE — 86803 HEPATITIS C AB TEST: CPT

## 2023-12-08 PROCEDURE — 36415 COLL VENOUS BLD VENIPUNCTURE: CPT

## 2023-12-08 NOTE — PATIENT INSTRUCTIONS
Work on a healthy diet; limit carbs and sweets; increase plan-based foods.  Exercise at least 150 minutes per week.

## 2024-01-03 DIAGNOSIS — G47.09 OTHER INSOMNIA: ICD-10-CM

## 2024-01-03 RX ORDER — ZOLPIDEM TARTRATE 10 MG/1
10 TABLET ORAL NIGHTLY PRN
Qty: 30 TABLET | Refills: 0 | Status: CANCELLED | OUTPATIENT
Start: 2024-01-03

## 2024-01-04 DIAGNOSIS — G47.09 OTHER INSOMNIA: ICD-10-CM

## 2024-01-04 RX ORDER — ZOLPIDEM TARTRATE 10 MG/1
10 TABLET ORAL NIGHTLY PRN
Qty: 30 TABLET | Refills: 3 | Status: SHIPPED | OUTPATIENT
Start: 2024-01-04

## 2024-01-07 RX ORDER — ATORVASTATIN CALCIUM 20 MG/1
20 TABLET, FILM COATED ORAL DAILY
Qty: 90 TABLET | Refills: 0 | Status: SHIPPED | OUTPATIENT
Start: 2024-01-07

## 2024-01-07 RX ORDER — CITALOPRAM HYDROBROMIDE 10 MG/1
10 TABLET ORAL DAILY
Qty: 90 TABLET | Refills: 0 | Status: SHIPPED | OUTPATIENT
Start: 2024-01-07

## 2024-01-07 RX ORDER — LISINOPRIL 20 MG/1
20 TABLET ORAL DAILY
Qty: 90 TABLET | Refills: 0 | Status: SHIPPED | OUTPATIENT
Start: 2024-01-07

## 2024-01-26 ENCOUNTER — TELEPHONE (OUTPATIENT)
Dept: FAMILY MEDICINE CLINIC | Facility: CLINIC | Age: 59
End: 2024-01-26
Payer: COMMERCIAL

## 2024-01-26 ENCOUNTER — OFFICE VISIT (OUTPATIENT)
Dept: FAMILY MEDICINE CLINIC | Facility: CLINIC | Age: 59
End: 2024-01-26
Payer: COMMERCIAL

## 2024-01-26 VITALS
SYSTOLIC BLOOD PRESSURE: 159 MMHG | OXYGEN SATURATION: 97 % | DIASTOLIC BLOOD PRESSURE: 91 MMHG | BODY MASS INDEX: 32.07 KG/M2 | HEART RATE: 87 BPM | WEIGHT: 204.3 LBS | HEIGHT: 67 IN | TEMPERATURE: 98.5 F

## 2024-01-26 DIAGNOSIS — K13.79 UVULAR SWELLING: ICD-10-CM

## 2024-01-26 DIAGNOSIS — R22.1 THROAT SWELLING: Primary | ICD-10-CM

## 2024-01-26 RX ORDER — AMOXICILLIN 875 MG/1
875 TABLET, COATED ORAL 2 TIMES DAILY
Qty: 20 TABLET | Refills: 0 | Status: SHIPPED | OUTPATIENT
Start: 2024-01-26 | End: 2024-02-05

## 2024-01-26 RX ORDER — METHYLPREDNISOLONE SODIUM SUCCINATE 125 MG/2ML
62.5 INJECTION, POWDER, LYOPHILIZED, FOR SOLUTION INTRAMUSCULAR; INTRAVENOUS ONCE
Status: COMPLETED | OUTPATIENT
Start: 2024-01-26 | End: 2024-01-26

## 2024-01-26 RX ORDER — METHYLPREDNISOLONE 4 MG/1
TABLET ORAL
Qty: 21 TABLET | Refills: 0 | Status: SHIPPED | OUTPATIENT
Start: 2024-01-26

## 2024-01-26 RX ADMIN — METHYLPREDNISOLONE SODIUM SUCCINATE 62.5 MG: 125 INJECTION, POWDER, LYOPHILIZED, FOR SOLUTION INTRAMUSCULAR; INTRAVENOUS at 09:38

## 2024-01-26 NOTE — TELEPHONE ENCOUNTER
"0730 -patient called stating that she was having some difficulty swallowing and that her uvula was swollen.  Patient \"thinks that she has an infection \".  Tried to call patient back but no answer.  Left voice to text on patient's phone stating she needed to go to urgent care, ER, or call Dr. Villagomez's office to see if there is an appointment to be evaluated today.  "

## 2024-01-26 NOTE — PROGRESS NOTES
"Chief Complaint  Swollen Uvula    Subjective        Elodia Alicea presents to [facility]  History of Present Illness    Pt is here today for a swollen uvula/throat. If she lays back it blocks her airway. It woke her up 3-4 times last night. She is unsure what caused it. She is allergic to pine nuts but this is not like her other reactions, typically her lips and tongue swell and her lungs get tight. She feels well other than having some difficulty swallowing. Her throat doesn't really hurt. She denies fever, chills, malaise.     Objective   Vital Signs:  /91 (BP Location: Left arm, Patient Position: Sitting, Cuff Size: Adult)   Pulse 87   Temp 98.5 °F (36.9 °C) (Temporal)   Ht 168.9 cm (66.5\")   Wt 92.7 kg (204 lb 4.8 oz)   SpO2 97%   BMI 32.48 kg/m²   Estimated body mass index is 32.48 kg/m² as calculated from the following:    Height as of this encounter: 168.9 cm (66.5\").    Weight as of this encounter: 92.7 kg (204 lb 4.8 oz).                Physical Exam  Constitutional:       General: She is not in acute distress.     Appearance: Normal appearance. She is not ill-appearing, toxic-appearing or diaphoretic.   HENT:      Mouth/Throat:      Pharynx: Uvula midline. Pharyngeal swelling, posterior oropharyngeal erythema and uvula swelling present.   Cardiovascular:      Rate and Rhythm: Normal rate and regular rhythm.      Pulses: Normal pulses.      Heart sounds: Normal heart sounds.   Pulmonary:      Effort: Pulmonary effort is normal. No respiratory distress.      Breath sounds: Normal breath sounds.   Musculoskeletal:      Cervical back: Normal range of motion and neck supple. No tenderness.   Lymphadenopathy:      Cervical: Cervical adenopathy present.   Skin:     General: Skin is warm and dry.      Capillary Refill: Capillary refill takes less than 2 seconds.   Neurological:      General: No focal deficit present.      Mental Status: She is alert and oriented to " person, place, and time.   Psychiatric:         Mood and Affect: Mood normal.         Behavior: Behavior normal.         Thought Content: Thought content normal.         Judgment: Judgment normal.        Result Review :                Assessment and Plan   Diagnoses and all orders for this visit:    1. Throat swelling (Primary)  -     methylPREDNISolone sodium succinate (SOLU-Medrol) injection 62.5 mg  -     amoxicillin (AMOXIL) 875 MG tablet; Take 1 tablet by mouth 2 (Two) Times a Day for 10 days.  Dispense: 20 tablet; Refill: 0  -     methylPREDNISolone (MEDROL) 4 MG dose pack; Take as directed on package instructions.  Dispense: 21 tablet; Refill: 0    2. Uvular swelling  -     methylPREDNISolone sodium succinate (SOLU-Medrol) injection 62.5 mg  -     methylPREDNISolone (MEDROL) 4 MG dose pack; Take as directed on package instructions.  Dispense: 21 tablet; Refill: 0             Follow Up   Return if symptoms worsen or fail to improve.  Patient was given instructions and counseling regarding her condition or for health maintenance advice. Please see specific information pulled into the AVS if appropriate.

## 2024-03-26 ENCOUNTER — TELEPHONE (OUTPATIENT)
Dept: ORTHOPEDIC SURGERY | Facility: CLINIC | Age: 59
End: 2024-03-26
Payer: COMMERCIAL

## 2024-03-26 NOTE — TELEPHONE ENCOUNTER
NEW PROBLEM - DX Closed fracture of distal lateral malleolus of right fibula, initial encounter - XRAYS 3-25-24 [facility] (CARE EVERY[facility]) NO SURGERY - SCHED REV FOR NO URGENT TIME FRAME AVAILABILITY - PLEASE ADVISE - THANK YOU      CAN CHRISTOPH SEE SOON?

## 2024-03-27 ENCOUNTER — OFFICE VISIT (OUTPATIENT)
Dept: ORTHOPEDIC SURGERY | Facility: CLINIC | Age: 59
End: 2024-03-27
Payer: COMMERCIAL

## 2024-03-27 VITALS — WEIGHT: 204 LBS | TEMPERATURE: 96.9 F | BODY MASS INDEX: 32.02 KG/M2 | HEIGHT: 67 IN

## 2024-03-27 DIAGNOSIS — T14.90XA INJURY: Primary | ICD-10-CM

## 2024-03-27 DIAGNOSIS — S82.64XA CLOSED NONDISPLACED FRACTURE OF LATERAL MALLEOLUS OF RIGHT FIBULA, INITIAL ENCOUNTER: ICD-10-CM

## 2024-03-27 NOTE — PROGRESS NOTES
New Patient Complaint      Patient: Elodia Alicea  YOB: 1965 58 y.o. female  Medical Record Number: [id number]    Chief Complaints: I hurt my ankle    History of Present Illness:   Patient injured her right ankle on 3/25/2024 when she stepped on a bone that her dog is left on the porch rolling her ankle.  She has had pain and swelling in the right ankle with difficulty bearing weight.  She was seen at [facility] and found of a distal fibula fracture and was placed into a splint.  She has been nonweightbearing and elevating.  Patient pain is rated 1 out of 10.  She been taking ibuprofen    Patient works at [employer] and has a trip in about a week and a half to go to [place] for the solar clips.    HPI    Allergies:   Allergies   Allergen Reactions    Percocet [Oxycodone-Acetaminophen] Itching     Feels like bugs crawling on her    Codeine Itching     unknown    Pine Nut Unknown - High Severity       Medications:   Current Outpatient Medications on File Prior to Visit   Medication Sig    atorvastatin (LIPITOR) 20 MG tablet Take 1 tablet by mouth Daily.    citalopram (CeleXA) 10 MG tablet Take 1 tablet by mouth Daily.    EPINEPHrine (EPIPEN) 0.3 MG/0.3ML solution auto-injector injection INJECT INTO THIGH AND HOLD 10 SEC FOR ALLERGIC REACTION    lisinopril (PRINIVIL,ZESTRIL) 20 MG tablet Take 1 tablet by mouth Daily.    meloxicam (MOBIC) 15 MG tablet Take 1 tablet by mouth Daily.    zolpidem (AMBIEN) 10 MG tablet Take 1 tablet by mouth At Night As Needed for Sleep.    methylPREDNISolone (MEDROL) 4 MG dose pack Take as directed on package instructions. (Patient not taking: Reported on 3/27/2024)     No current facility-administered medications on file prior to visit.       Past Medical History:   Diagnosis Date    Anxiety     Hyperlipidemia     Hypertension      Past Surgical History:   Procedure Laterality Date    BREAST SURGERY       SECTION      REDUCTION MAMMAPLASTY       Social  "History     Occupational History    Not on file   Tobacco Use    Smoking status: Former     Current packs/day: 0.00     Types: Cigarettes     Quit date: 3/31/2020     Years since quitting: 3.9    Smokeless tobacco: Former   Vaping Use    Vaping status: Never Used   Substance and Sexual Activity    Alcohol use: Yes     Comment: once every six month     Drug use: Never    Sexual activity: Defer      Social History     Social History Narrative    Not on file     Family History   Problem Relation Age of Onset    Diabetes Mother     Hypertension Mother     Diabetes Father     Hypertension Father     Hyperlipidemia Father        Review of Systems: 14 point review of systems performed, positive pertinent findings identified in HPI. All remaining systems negative     Review of Systems      Physical Exam:   Vitals:    03/27/24 1029   Temp: 96.9 °F (36.1 °C)   Weight: 92.5 kg (204 lb)   Height: 168.9 cm (66.5\")   PainSc:   1     Physical Exam   Constitutional: pleasant, well developed she is accompanied  Eyes: sclera non icteric  Hearing : adequate for exam  Cardiovascular: palpable pulses in right foot, right calf/ thigh NT without sign of DVT  Respiratoy: breathing unlabored   Neurological: grossly sensate to LT throughout right LE  Psychiatric: oriented with normal mood and affect.   Lymphatic: No palpable popliteal lymphadenopathy right LE  Skin: intact throughout right leg/foot  Musculoskeletal: She has moderate tenderness palpation right distal fibula and minimal pain discomfort medially and no pain with dorsum of the right midfoot.  Physical Exam  Ortho Exam    Radiology: 3 views of the right ankle ordered evaluate fracture alignment reviewed no prior x-rays available for comparison there is a nondisplaced transverse fracture along the distal fibula.  No widening of medial clear space or syndesmosis.    Assessment/Plan: 1.  Right transverse distal fibula fracture    We discussed treatment going forward and would not " recommend surgical treatment for this understanding that should it fail to heal or displace could require surgical treatment.    I also do not think we need any further workup as far as MRI as I do not feel it would change our treatment protocol    We had her fitted with a boot today basely to treat as a cast and adjust pressure on it as needed and to sleep in this for at least the next 7 to 10 days remove it intermittently to check her skin.  She may then start sleeping Air-Stirrup brace if tolerated    When comfortable she may start doing some partial weightbearing with walker or crutches and is also to work on getting a scooter.    Counseled her to avoid any anti-inflammatories prescription or over-the-counter other than Tylenol so as to minimize bone healing and admission.    I think she is okay to go on her trip recommend that she take aspirin full-strength several days prior to and while traveling to minimize chance of blood clot.    Will see her back in 3 weeks with x-rays of her right ankle    Reviewed she may not likely be able to return to work for at least 8 weeks depending on how she does.

## 2024-04-04 RX ORDER — LISINOPRIL 20 MG/1
20 TABLET ORAL DAILY
Qty: 90 TABLET | Refills: 0 | Status: SHIPPED | OUTPATIENT
Start: 2024-04-04

## 2024-04-04 RX ORDER — CITALOPRAM HYDROBROMIDE 10 MG/1
10 TABLET ORAL DAILY
Qty: 90 TABLET | Refills: 0 | Status: SHIPPED | OUTPATIENT
Start: 2024-04-04

## 2024-04-04 RX ORDER — ATORVASTATIN CALCIUM 20 MG/1
20 TABLET, FILM COATED ORAL DAILY
Qty: 90 TABLET | Refills: 0 | Status: SHIPPED | OUTPATIENT
Start: 2024-04-04

## 2024-04-17 ENCOUNTER — OFFICE VISIT (OUTPATIENT)
Dept: ORTHOPEDIC SURGERY | Facility: CLINIC | Age: 59
End: 2024-04-17
Payer: COMMERCIAL

## 2024-04-17 VITALS — TEMPERATURE: 96.8 F | WEIGHT: 204 LBS | BODY MASS INDEX: 32.02 KG/M2 | HEIGHT: 67 IN

## 2024-04-17 DIAGNOSIS — S82.64XD CLOSED NONDISPLACED FRACTURE OF LATERAL MALLEOLUS OF RIGHT FIBULA WITH ROUTINE HEALING, SUBSEQUENT ENCOUNTER: ICD-10-CM

## 2024-04-17 DIAGNOSIS — R52 PAIN: Primary | ICD-10-CM

## 2024-04-17 NOTE — PROGRESS NOTES
"Ankle Follow Up      Patient: Elodia Alicea    YOB: 1965 58 y.o. female    Chief Complaints: Ankle \"is fantastic\"    History of Present Illness:Patient was seen on 3/27/2024 reporting that she injured her right ankle on 3/25/2024 when she stepped on a bone that her dog had left on the porch rolling her ankle.  She had had pain and swelling in the right ankle with difficulty bearing weight.  She was seen at [facility] and found of a distal fibula fracture and was placed into a splint.  She had been nonweightbearing and elevating.  Pain was rated 1 out of 10.  She had been taking ibuprofen     Patient reported that she worked at [employer] in [place] and has a trip in about a week and a half to go to [place] for the solar eclipse.    Reviewed that I would not recommend any surgical treatment at that time and did not feel he needed further workup.  She was fitted with a boot to basically treat as a cast and sleep and that for at least 7 to 10 days remove it intermittently to check her skin and then start sleeping Air-Stirrup brace.  When she was comfortable she was out some partial weightbearing with walker or crutches and also was can work on getting a scooter.  She was counseled avoid anti-inflammatories.    Patient is seen back today stating that she is feeling fantastic.  She not having appreciable pain.  She has been using her boot but no crutches for the last 3 days.  She could not tolerate the night splint and has been sleeping in her boot.  And also got a smaller over-the-counter elastic type brace with some medial and lateral stays.  She was able to go to [employer] for her trip.  Pain is rated 0 out of 10  HPI    ROS: ankle pain  Past Medical History:   Diagnosis Date    Anxiety     Hyperlipidemia     Hypertension        Physical Exam:   Vitals:    04/17/24 1032   Temp: 96.8 °F (36 °C)   Weight: 92.5 kg (204 lb)   Height: 168.9 cm (66.5\")   PainSc: 0-No pain     Well developed with normal mood.  " She is accompanied.  Right ankle shows minimal swelling and minimal if any discomfort to palpation over the lateral distal fibula.  Nontender over the medial right ankle.      Radiology: 3 views of the right ankle ordered evaluate fracture alignment reviewed and compared to previous x-rays.  There is no displacement of the transverse distal fibula fracture does appear to be healing to some degree no displacement compared with previous x-rays talus remains well-seated within the mortise.      Assessment/Plan: 1.  Right transverse distal fibula fracture    We discussed treatment going forward and although fracture is still evident on x-rays clinically she is improving and would not recommend any further workup or surgical treatment.    She been weightbearing in her boot and tolerating this well allow her to continue doing as such and she can try sleeping Air-Stirrup if not she will sleep in the boot.  Or may try sleeping in the brace that she has.    Will allow her to do seated work but may need to hold off that for couple weeks as she is now not able to drive safely.    Will see her back in 3 weeks with x-ray of her right ankle.  She will bring her brace with her.

## 2024-04-24 DIAGNOSIS — G47.09 OTHER INSOMNIA: ICD-10-CM

## 2024-04-25 RX ORDER — ZOLPIDEM TARTRATE 10 MG/1
10 TABLET ORAL NIGHTLY PRN
Qty: 30 TABLET | Refills: 3 | Status: SHIPPED | OUTPATIENT
Start: 2024-04-25

## 2024-05-08 ENCOUNTER — OFFICE VISIT (OUTPATIENT)
Dept: ORTHOPEDIC SURGERY | Facility: CLINIC | Age: 59
End: 2024-05-08
Payer: COMMERCIAL

## 2024-05-08 VITALS — TEMPERATURE: 97.3 F | BODY MASS INDEX: 32.78 KG/M2 | WEIGHT: 204 LBS | HEIGHT: 66 IN

## 2024-05-08 DIAGNOSIS — R52 PAIN: Primary | ICD-10-CM

## 2024-05-08 DIAGNOSIS — S82.64XD CLOSED NONDISPLACED FRACTURE OF LATERAL MALLEOLUS OF RIGHT FIBULA WITH ROUTINE HEALING, SUBSEQUENT ENCOUNTER: ICD-10-CM

## 2024-05-08 PROCEDURE — 99213 OFFICE O/P EST LOW 20 MIN: CPT | Performed by: ORTHOPAEDIC SURGERY

## 2024-05-08 PROCEDURE — 73610 X-RAY EXAM OF ANKLE: CPT | Performed by: ORTHOPAEDIC SURGERY

## 2024-06-19 ENCOUNTER — AMBULATORY SURGICAL CENTER (AMBULATORY)
Dept: URBAN - METROPOLITAN AREA SURGERY 17 | Facility: SURGERY | Age: 59
End: 2024-06-19
Payer: COMMERCIAL

## 2024-06-19 ENCOUNTER — OFFICE (AMBULATORY)
Dept: URBAN - METROPOLITAN AREA PATHOLOGY 4 | Facility: PATHOLOGY | Age: 59
End: 2024-06-19
Payer: COMMERCIAL

## 2024-06-19 VITALS
DIASTOLIC BLOOD PRESSURE: 85 MMHG | RESPIRATION RATE: 19 BRPM | HEART RATE: 87 BPM | DIASTOLIC BLOOD PRESSURE: 83 MMHG | OXYGEN SATURATION: 98 % | SYSTOLIC BLOOD PRESSURE: 122 MMHG | DIASTOLIC BLOOD PRESSURE: 74 MMHG | HEART RATE: 76 BPM | DIASTOLIC BLOOD PRESSURE: 94 MMHG | RESPIRATION RATE: 14 BRPM | RESPIRATION RATE: 6 BRPM | SYSTOLIC BLOOD PRESSURE: 142 MMHG | SYSTOLIC BLOOD PRESSURE: 129 MMHG | WEIGHT: 200 LBS | RESPIRATION RATE: 18 BRPM | HEART RATE: 89 BPM | RESPIRATION RATE: 15 BRPM | DIASTOLIC BLOOD PRESSURE: 96 MMHG | TEMPERATURE: 97.4 F | HEART RATE: 81 BPM | OXYGEN SATURATION: 96 % | HEART RATE: 90 BPM | DIASTOLIC BLOOD PRESSURE: 101 MMHG | TEMPERATURE: 96.9 F | DIASTOLIC BLOOD PRESSURE: 75 MMHG | HEIGHT: 65 IN | SYSTOLIC BLOOD PRESSURE: 150 MMHG | SYSTOLIC BLOOD PRESSURE: 154 MMHG | SYSTOLIC BLOOD PRESSURE: 171 MMHG | HEART RATE: 79 BPM | DIASTOLIC BLOOD PRESSURE: 72 MMHG | HEART RATE: 100 BPM | RESPIRATION RATE: 8 BRPM | SYSTOLIC BLOOD PRESSURE: 165 MMHG | DIASTOLIC BLOOD PRESSURE: 102 MMHG | SYSTOLIC BLOOD PRESSURE: 119 MMHG | SYSTOLIC BLOOD PRESSURE: 118 MMHG | HEART RATE: 91 BPM | OXYGEN SATURATION: 97 % | RESPIRATION RATE: 16 BRPM

## 2024-06-19 DIAGNOSIS — D12.0 BENIGN NEOPLASM OF CECUM: ICD-10-CM

## 2024-06-19 DIAGNOSIS — K57.30 DIVERTICULOSIS OF LARGE INTESTINE WITHOUT PERFORATION OR ABS: ICD-10-CM

## 2024-06-19 DIAGNOSIS — Z12.11 ENCOUNTER FOR SCREENING FOR MALIGNANT NEOPLASM OF COLON: ICD-10-CM

## 2024-06-19 PROBLEM — K63.5 POLYP OF COLON: Status: ACTIVE | Noted: 2024-06-19

## 2024-06-19 LAB
GI HISTOLOGY: A. CECUM: (no result)
GI HISTOLOGY: PDF REPORT: (no result)

## 2024-06-19 PROCEDURE — 88305 TISSUE EXAM BY PATHOLOGIST: CPT | Performed by: PATHOLOGY

## 2024-06-19 PROCEDURE — 45385 COLONOSCOPY W/LESION REMOVAL: CPT | Mod: 33 | Performed by: INTERNAL MEDICINE

## 2024-07-03 RX ORDER — ATORVASTATIN CALCIUM 20 MG/1
20 TABLET, FILM COATED ORAL DAILY
Qty: 90 TABLET | Refills: 0 | Status: SHIPPED | OUTPATIENT
Start: 2024-07-03

## 2024-07-03 RX ORDER — LISINOPRIL 20 MG/1
20 TABLET ORAL DAILY
Qty: 90 TABLET | Refills: 0 | Status: SHIPPED | OUTPATIENT
Start: 2024-07-03

## 2024-07-03 RX ORDER — CITALOPRAM HYDROBROMIDE 10 MG/1
10 TABLET ORAL DAILY
Qty: 90 TABLET | Refills: 0 | Status: SHIPPED | OUTPATIENT
Start: 2024-07-03

## 2024-08-25 DIAGNOSIS — G47.09 OTHER INSOMNIA: ICD-10-CM

## 2024-08-25 RX ORDER — ZOLPIDEM TARTRATE 10 MG/1
10 TABLET ORAL NIGHTLY PRN
Qty: 30 TABLET | Refills: 3 | Status: SHIPPED | OUTPATIENT
Start: 2024-08-25

## 2024-09-30 ENCOUNTER — TRANSCRIBE ORDERS (OUTPATIENT)
Age: 59
End: 2024-09-30
Payer: COMMERCIAL

## 2024-09-30 DIAGNOSIS — S46.812D STRAIN OF OTHER MUSCLES, FASCIA AND TENDONS AT SHOULDER AND UPPER ARM LEVEL, LEFT ARM, SUBSEQUENT ENCOUNTER: Primary | ICD-10-CM
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
## 2024-10-09 ENCOUNTER — TREATMENT (OUTPATIENT)
Age: 59
End: 2024-10-09
Payer: COMMERCIAL

## 2024-10-09 DIAGNOSIS — S46.912D MUSCLE STRAIN OF LEFT SCAPULAR REGION, SUBSEQUENT ENCOUNTER: ICD-10-CM

## 2024-10-09 DIAGNOSIS — S46.812D STRAIN OF OTHER MUSCLES, FASCIA AND TENDONS AT SHOULDER AND UPPER ARM LEVEL, LEFT ARM, SUBSEQUENT ENCOUNTER: Primary | ICD-10-CM

## 2024-10-09 NOTE — PROGRESS NOTES
[facility] [place]              [address]                                                                                   [place] [address]                         Phone [phone]                   Fax [phone]        Physical Therapy Initial Evaluation and Plan of Care    Patient: Elodia Alicea   : 1965  Diagnosis/ICD-10 Code:  Strain of other muscles, fascia and tendons at shoulder and upper arm level, left arm, subsequent encounter [S46.812D]  Referring practitioner: HECTOR Martienz  Date of Initial Visit: 10/9/2024  Today's Date: 10/9/2024  Patient seen for 1 session         Visit Diagnoses:    ICD-10-CM ICD-9-CM   1. Strain of other muscles, fascia and tendons at shoulder and upper arm level, left arm, subsequent encounter  S46.812D 840.8   2. Muscle strain of left scapular region, subsequent encounter  S46.912D V58.89     840.9         Subjective Questionnaire: QuickDASH: 27%      Subjective Evaluation    History of Present Illness  Onset date: .  Mechanism of injury: Putting supplies away at work, lifting box of straws at work overhead with left shoulder.  Felt horrible pain but wouldn't go away. Within an hour I went to [facility].  Better with meds.      Subjective comment: No prior history of injury of shoulder or neck. Ok now to lift ovehead.  Now occasional.  Numbess and tingling front of biceps. 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   Precautions and Work Restrictions: Light duty don't lift left arm overhead.Pain  Current pain ratin  At worst pain ratin  Location: left scapula at infraspinatus  Quality: dull ache (sore)  Relieving factors: medications and ice  Aggravating factors: repetitive movement (behind back, shoulder extension)  Progression: improved    Hand dominance: right    Diagnostic Tests  No diagnostic  tests performed    Treatments  Previous treatment: medication  Patient Goals  Patient goals for therapy: return to work         PMH:  HTN, High cholesterol    Objective          Postural Observations  Seated posture: good  Standing posture: good      Observations   Left Shoulder   Negative for atrophy, deformity and edema.       Palpation   Left   Tenderness of the infraspinatus.     Tenderness     Left Shoulder   Tenderness in the infraspinatus tendon and scapular spine.     Cervical/Thoracic Screen   Cervical range of motion within normal limits    Neurological Testing     Sensation     Shoulder   Left Shoulder   Intact: light touch    Right Shoulder   Intact: Light touch    Active Range of Motion   Left Shoulder   Flexion: WFL  Abduction: Left shoulder active abduction: painful arc from 90 to end range. WFL and with pain  External rotation 90°: 70 degrees with pain  Internal rotation BTB: T12 with pain    Right Shoulder   Flexion: WFL  External rotation 90°: 90 degrees  Internal rotation BTB: T10     Passive Range of Motion   Left Shoulder   Normal passive range of motion  Abduction: with pain  External rotation 90°: with pain  Internal rotation 90°: with pain    Joint Play   Left Shoulder  Joints within functional limits are the anterior capsule, posterior capsule and inferior capsule.     Strength/Myotome Testing     Left Shoulder     Planes of Motion   Flexion: 5   Abduction: 5   External rotation at 0°: 4+   Internal rotation at 0°: 4 (mild)     Isolated Muscles   Infraspinatus: 3+   Supraspinatus: 5     Tests     Left Shoulder   Positive painful arc.   Negative anterior load and shift, AC shear, apprehension, drop arm, empty can, full can, Hawkin's, Neer's, posterior load and shift and sulcus sign.        Access Code: [email or web address]  URL: [email or web address]  Date: 10/09/2024  Prepared by: Alia Johnson    Exercises  - Standing Shoulder Posterior Capsule Stretch  - 3 x daily - 7 x weekly - 1 sets - 4 reps  - 15 hold  - Gentle Levator Scapulae Stretch  - 3 x daily - 7 x weekly - 1 sets - 4 reps - 15 hold  - Standing Backward Shoulder Rolls  - 3 x daily - 7 x weekly - 1 sets - 4 reps - 15 hold  - Seated Shoulder External Rotation  - 2 x daily - 7 x weekly - 2 sets - 10 reps  - Isometric Shoulder Internal Rotation  - 2 x daily - 7 x weekly - 1 sets - 10 reps - 3 hold  - Seated Isometric Shoulder External Rotation with Towel  - 2 x daily - 7 x weekly - 1 sets - 10 reps - 3 hold  - Standing Shoulder Abduction AAROM with Dowel  - 3 x daily - 7 x weekly - 1 sets - 10 reps      Patient was educated on findings of evaluation, purpose of treatment, and goals for therapy.  Patient was educated on exercises/self treatment/pain relief techniques.Patient educated on anatomy, healing process, restrictions reviewed, posture, sleeping position, purpose of therapy, pain control, and plan of care discussed.  All questions answered.     Assessment & Plan       Assessment  Impairments: abnormal or restricted ROM, impaired physical strength, lacks appropriate home exercise program and pain with function   Functional limitations: carrying objects, lifting, pulling, pushing, uncomfortable because of pain and reaching overhead   Assessment details: Patient is a 59-year-old female with recent onset of severe left scapular pain after reaching overhead at work to  an item on a shelf.  Her pain has improved with rest.  At this time she is noting to have pain with certain positions such as abduction and internal rotation.  She has tenderness along the posterior scapula, weakness in the shoulder and scapula, full range of motion but pain at the endrange and normal vital testing of shoulder.  Her cervical spine has full range of motion she has no signs or symptoms of neurological involvement, and myotomes and dermatomes are intact.  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  She is also very active with several hobbies that require use of the left upper extremity.  Based on the above findings patient is an excellent candidate for therapy to restore full function of the shoulder and be able to return to work full duty.    Goals  Plan Goals: STG X 2 weeks  1.  Patient will tolerate and be compliant with HEP.  2.  Increases scapular/shoulder strength half grade.  3.  Demonstrate improved posture and tolerance for up to 30 minutes without pain.  4.  Tolerate lifting repetitively up to 15 pounds with total symptoms overhead, from the floor and chest level.  LTG X 8 weeks  1. Independent with HEP.  2.  5/5 left UE strength.  3.  Pain-free left shoulder active range of motion.  4.  Left shoulder strength and scapular strength 5 out of 5 without pain.  5.  Patient demonstrated ability to lift up to 30 pounds from the floor to waist level and 20 pounds overhead pain.  6.  Patient demonstrated ability to pull up to 20 pounds as needed to rule cart of supplies.      Plan  Therapy options: will be seen for skilled therapy services  Planned modality interventions: cryotherapy, dry needling, iontophoresis, ultrasound and electrical stimulation/Russian stimulation  Planned therapy interventions: manual therapy, functional ROM exercises, home exercise program, strengthening, stretching, neuromuscular re-education and postural training  Frequency: 3x week  Duration in weeks: 6  Treatment plan discussed with: patient  Plan details: Up to 6 weeks as needed per MD.        History # of Personal Factors and/or Comorbidities: MODERATE (1-2)  Examination of Body System(s): # of elements: LOW (1-2)  Clinical Presentation: EVOLVING  Clinical Decision Making: LOW       Timed:         Manual Therapy:    -     mins  48772;     Therapeutic Exercise:    10     mins  59324;     Neuromuscular Angel:    -    mins  67160;    Therapeutic Activity:     -     mins  55358;     Gait  Training:      -     mins  54260;     Ultrasound:     8     mins  59428;    Ionto                               8    mins   44383  Self Care                       10     mins   90345  Orthotic fit/train              -   mins  92003  Self Pay 15  -       mins   PTSPMIN1  Self Pay 30  -      mins  PTSPMIN2   Dry Needling Tri __-__ DNTRIAL    Un-Timed:  Electrical Stimulation:    -     mins  66670 (MC );  Dry Needling     -     mins self-pay  Traction     -     mins 37608  Low Eval          Mins  81225  Mod Eval     30     Mins  01875  High Eval                       -     Mins  75392        Timed Treatment:   36   mins   Total Treatment:     75   mins          PT: Alia Johnson PT, CIDN     License Number: [id number]  Electronically signed by Alia Johnson PT, 10/08/24, 9:16 PM EDT    Certification Period: 10/9/2024 thru 1/6/2025  I certify that the therapy services are furnished while this patient is under my care.  The services outlined above are required by this patient, and will be reviewed every 90 days.         Physician Signature:__________________________________________________    PHYSICIAN: Kyle Lerma PA      DATE:     Please sign and return via fax to [phone]. Thank you, [facility].

## 2024-10-11 ENCOUNTER — TREATMENT (OUTPATIENT)
Age: 59
End: 2024-10-11
Payer: COMMERCIAL

## 2024-10-11 DIAGNOSIS — S46.812D STRAIN OF OTHER MUSCLES, FASCIA AND TENDONS AT SHOULDER AND UPPER ARM LEVEL, LEFT ARM, SUBSEQUENT ENCOUNTER: ICD-10-CM

## 2024-10-11 DIAGNOSIS — S46.912D MUSCLE STRAIN OF LEFT SCAPULAR REGION, SUBSEQUENT ENCOUNTER: Primary | ICD-10-CM

## 2024-10-11 NOTE — PROGRESS NOTES
Physical Therapy Daily Progress Note      Patient: Elodia Alicea   : 1965  Referring practitioner: No ref. provider found  Date of Initial Visit: Type: THERAPY  Noted: 10/9/2024  Diagnosis/ICD-10 Code:  Muscle strain of left scapular region, subsequent encounter [S46.912D]  Today's Date: 10/11/2024  Patient seen for 2 sessions         Elodia Alicea reports: treatment helped.  Less of a hot spot noted on my shoulder blade.  No issues with exercises.              Objective          Postural Observations  Seated posture: good  Standing posture: good      Observations   Left Shoulder   Negative for atrophy, deformity and edema.     Additional Shoulder Observation Details  no irritation noted along skin from I[place].    Palpation   Left   Tenderness of the infraspinatus.     Tenderness     Left Shoulder   Tenderness in the infraspinatus tendon and scapular spine.     Cervical/Thoracic Screen   Cervical range of motion within normal limits    Neurological Testing     Sensation     Shoulder   Left Shoulder   Intact: light touch    Right Shoulder   Intact: Light touch    Active Range of Motion   Left Shoulder   Flexion: WFL  Abduction: Left shoulder active abduction: painful arc from 90 to end range. WFL and with pain  External rotation 90°: 70 degrees with pain  Internal rotation BTB: T12 with pain    Right Shoulder   Flexion: WFL  External rotation 90°: 90 degrees  Internal rotation BTB: T10     Passive Range of Motion   Left Shoulder   Normal passive range of motion  Abduction: with pain  External rotation 90°: with pain  Internal rotation 90°: with pain    Joint Play   Left Shoulder  Joints within functional limits are the anterior capsule, posterior capsule and inferior capsule.     Strength/Myotome Testing     Left Shoulder     Planes of Motion   Flexion: 5   Abduction: 5   External rotation at 0°: 4+   Internal rotation at 0°: 4 (mild)     Isolated Muscles   Infraspinatus: 4   Supraspinatus: 5     Tests      Left Shoulder   Positive painful arc.   Negative anterior load and shift, AC shear, apprehension, drop arm, empty can, full can, Hawkin's, Neer's, posterior load and shift and sulcus sign.     Functional Assessment     Comments  Lifting: floor to knee to waist - 5lbs box without pain  Waist to shoulder - 5lbs box with mild discomfort    Pullling: 10lbs without pain      See Exercise, Manual, and Modality Logs for complete treatment.     Patient education: lifting, body mechanics, healing process.       Assessment/Plan  Patient presents with improving tenderness and left shoulder ROM.  Able to progress to lift work simulation with only pain with overhead lifting.  Continues with tenderness and mild pain with overhead flexion and abduction of left shoulder.  She still has slight weakness with internal and external rotation but pain has improved.       Awaiting MD orders continue a few more sessions if agreeable         Timed:  Manual Therapy:    -     mins  41841;  Therapeutic Exercise:    10     mins  64880;     Neuromuscular Angel:    -    mins  02287;    Therapeutic Activity:    8     mins  45483;   Gait Training:                 -    mins  71990;   Self Care                     8   mins   45047;  Orthotic fit/train              --     mins  36002;   Dry Needling Tri          __-__ DNTRIAL;   Ultrasound:    8   mins  01507;  Ionto                         8     mins 93456 ;  Self Pay 15                  -       mins   PTSPMIN1  Self Pay 30                  -      mins  PTSPMIN2      Untimed:  Electrical Stimulation:    -     mins  06880 ( );  Mechanical Traction:    -     mins  50532;   Dry Needling:              __-_ mins 73936, 66157  Paraffin       -  minutes 98127     Timed Treatment:   42   mins   Total Treatment:     50   mins  Alia Johnson, PT, CIDN  Physical Therapist

## 2024-10-11 NOTE — LETTER
Physical Therapy Daily Progress Note      Patient: Elodia Alicea   : 1965  Referring practitioner:LORE Randhawa  Date of Initial Visit: Type: THERAPY  Noted: 10/9/2024  Diagnosis/ICD-10 Code:  Muscle strain of left scapular region, subsequent encounter [S46.912D]  Today's Date: 10/11/2024  Patient seen for 2 sessions         Elodia Alicea reports: treatment helped.  Less of a hot spot noted on my shoulder blade.  No issues with exercises.               Objective          Postural Observations  Seated posture: good  Standing posture: good      Observations   Left Shoulder   Negative for atrophy, deformity and edema.     Additional Shoulder Observation Details  no irritation noted along skin from I[place]op[place].    Palpation   Left   Tenderness of the infraspinatus.     Tenderness     Left Shoulder   Tenderness in the infraspinatus tendon and scapular spine.     Cervical/Thoracic Screen   Cervical range of motion within normal limits    Neurological Testing     Sensation     Shoulder   Left Shoulder   Intact: light touch    Right Shoulder   Intact: Light touch    Active Range of Motion   Left Shoulder   Flexion: WFL  Abduction: Left shoulder active abduction: painful arc from 90 to end range. WFL and with pain  External rotation 90°: 70 degrees with pain  Internal rotation BTB: T12 with pain    Right Shoulder   Flexion: WFL  External rotation 90°: 90 degrees  Internal rotation BTB: T10     Passive Range of Motion   Left Shoulder   Normal passive range of motion  Abduction: with pain  External rotation 90°: with pain  Internal rotation 90°: with pain    Joint Play   Left Shoulder  Joints within functional limits are the anterior capsule, posterior capsule and inferior capsule.     Strength/Myotome Testing     Left Shoulder     Planes of Motion   Flexion: 5   Abduction: 5   External rotation at 0°: 4+   Internal rotation at 0°: 4 (mild discomfort)     Isolated Muscles   Infraspinatus: 4   Supraspinatus: 5      Tests     Left Shoulder   Positive painful arc.   Negative anterior load and shift, AC shear, apprehension, drop arm, empty can, full can, Hawkin's, Neer's, posterior load and shift and sulcus sign.     Functional Assessment     Lifting: floor to knee to waist - 5lbs box without pain  Waist to shoulder - 5lbs box with mild discomfort    Pullling: 10lbs without pain      See Exercise, Manual, and Modality Logs for complete treatment.     Patient education: lifting, body mechanics, healing process.       Assessment/Plan  Patient presents with improving tenderness and left shoulder ROM.  Able to progress to lift work simulation with only pain with overhead lifting.  Continues with tenderness and mild pain with overhead flexion and abduction of left shoulder.  She still has slight weakness with internal and external rotation but pain has improved.       Awaiting MD orders; continue a few more sessions if agreeable    Alia Johnson, PT, CIDN  Physical Therapist

## 2024-10-14 RX ORDER — ATORVASTATIN CALCIUM 20 MG/1
20 TABLET, FILM COATED ORAL DAILY
Qty: 90 TABLET | Refills: 0 | Status: SHIPPED | OUTPATIENT
Start: 2024-10-14

## 2024-10-14 RX ORDER — LISINOPRIL 20 MG/1
20 TABLET ORAL DAILY
Qty: 90 TABLET | Refills: 0 | Status: SHIPPED | OUTPATIENT
Start: 2024-10-14

## 2024-10-14 RX ORDER — CITALOPRAM HYDROBROMIDE 10 MG/1
10 TABLET ORAL DAILY
Qty: 90 TABLET | Refills: 0 | Status: SHIPPED | OUTPATIENT
Start: 2024-10-14

## 2024-10-15 ENCOUNTER — TREATMENT (OUTPATIENT)
Age: 59
End: 2024-10-15
Payer: COMMERCIAL

## 2024-10-15 DIAGNOSIS — S46.812D STRAIN OF OTHER MUSCLES, FASCIA AND TENDONS AT SHOULDER AND UPPER ARM LEVEL, LEFT ARM, SUBSEQUENT ENCOUNTER: ICD-10-CM

## 2024-10-15 DIAGNOSIS — S46.912D MUSCLE STRAIN OF LEFT SCAPULAR REGION, SUBSEQUENT ENCOUNTER: Primary | ICD-10-CM

## 2024-10-15 PROCEDURE — 97112 NEUROMUSCULAR REEDUCATION: CPT | Performed by: PHYSICAL THERAPIST

## 2024-10-15 PROCEDURE — 97530 THERAPEUTIC ACTIVITIES: CPT | Performed by: PHYSICAL THERAPIST

## 2024-10-15 PROCEDURE — 97033 APP MDLTY 1+IONTPHRSIS EA 15: CPT | Performed by: PHYSICAL THERAPIST

## 2024-10-15 PROCEDURE — 97110 THERAPEUTIC EXERCISES: CPT | Performed by: PHYSICAL THERAPIST

## 2024-10-15 PROCEDURE — 97035 APP MDLTY 1+ULTRASOUND EA 15: CPT | Performed by: PHYSICAL THERAPIST

## 2024-10-15 NOTE — PROGRESS NOTES
Physical Therapy Daily Progress Note      Patient: Elodia Alicea   : 1965  Referring practitioner: No ref. provider found  Date of Initial Visit: Type: THERAPY  Noted: 10/9/2024  Diagnosis/ICD-10 Code:  Muscle strain of left scapular region, subsequent encounter [S46.912D]  Today's Date: 10/15/2024  Patient seen for 3 sessions         Elodia Alicea reports: better overall.  Saw Md, one more week on restricted.  I still have some soreness but using more at home with good tolerance.              Objective   See Exercise, Manual, and Modality Logs for complete treatment.   Full left shoulder flexion and abduction with mild discomfort at end range, no sharp pain.  Tenderness:  infraspinatus left belly, decreased     Access Code: [email or web address]  URL: [email or web address]  Date: 10/15/2024  Prepared by: Alia Johnson    Exercises  - Standing Shoulder Posterior Capsule Stretch  - 3 x daily - 7 x weekly - 1 sets - 4 reps - 15 hold  - Gentle Levator Scapulae Stretch  - 3 x daily - 7 x weekly - 1 sets - 4 reps - 15 hold  - Standing Backward Shoulder Rolls  - 3 x daily - 7 x weekly - 1 sets - 4 reps - 15 hold  - Seated Shoulder External Rotation  - 2 x daily - 7 x weekly - 2 sets - 10 reps  - Isometric Shoulder Internal Rotation  - 2 x daily - 7 x weekly - 1 sets - 10 reps - 3 hold  - Seated Isometric Shoulder External Rotation with Towel  - 2 x daily - 7 x weekly - 1 sets - 10 reps - 3 hold  - Standing Shoulder Abduction AAROM with Dowel  - 3 x daily - 7 x weekly - 1 sets - 10 reps  - Doorway Pec Stretch at 90 Degrees Abduction  - 1 x daily - 7 x weekly - 1 sets - 4 reps - 15 hold  - Scaption Wall Slide with Towel  - 1 x daily - 7 x weekly - 1 sets - 10 reps  - Squatting Shoulder Row with Anchored Resistance  - 3 x daily - 7 x weekly - 1 sets - 10 reps  - Standing Chest Press with Resistance Band with PLB  - 3 x daily - 7 x weekly - 1 sets - 10 reps  - Staggered Lat Pull Down with Resistance - Elbows Bent  - 1 x  daily - 7 x weekly - 1 sets - 10 reps    Pt education: healing process, KT taping for home for support, HEP updated.    Assessment/Plan  Patient presents with improving muscle pain, strengthe, and tenderness.  Still weakness with additional strength exercises but not painful.  Continues with tenderness and weakness.       Progress per Plan of Care  Assess new exercises.         Timed:  Manual Therapy:    -     mins  98628;  Therapeutic Exercise:    10    mins  86843;     Neuromuscular Angel:    10    mins  13958;    Therapeutic Activity:     10     mins  13106;   Gait Training:                 -     mins  16511;   Self Care                       -     mins   19901;  Orthotic fit/train              -     mins  35130;   Dry Needling Tri          __-__ DNTRIAL;   Ultrasound:     8     mins  46077;  Ionto                          8     mins 08234 ;  Self Pay 15                  -       mins   [email or web address]  Self Pay 30                  -      mins  [email or web address]      Untimed:  Electrical Stimulation:    -     mins  39955 ( );  Mechanical Traction:    -     mins  94343;   Dry Needling:              __-_ mins 55213, 48364  Paraffin       -  minutes 06580     Timed Treatment:   46   mins   Total Treatment:     55   mins  Alia Johnson, PT, CIDN  Physical Therapist

## 2024-10-16 ENCOUNTER — TREATMENT (OUTPATIENT)
Age: 59
End: 2024-10-16
Payer: COMMERCIAL

## 2024-10-16 DIAGNOSIS — S46.812D STRAIN OF OTHER MUSCLES, FASCIA AND TENDONS AT SHOULDER AND UPPER ARM LEVEL, LEFT ARM, SUBSEQUENT ENCOUNTER: ICD-10-CM

## 2024-10-16 DIAGNOSIS — S46.912D MUSCLE STRAIN OF LEFT SCAPULAR REGION, SUBSEQUENT ENCOUNTER: Primary | ICD-10-CM

## 2024-10-16 NOTE — PROGRESS NOTES
Physical Therapy Daily Progress Note      Patient: Elodia Alicea   : 1965  Referring practitioner: HECTOR Martinez  Date of Initial Visit: Type: THERAPY  Noted: 10/9/2024  Diagnosis/ICD-10 Code:  Muscle strain of left scapular region, subsequent encounter [S46.912D]  Today's Date: 10/16/2024  Patient seen for 4 sessions         Elodia Alicea reports patient reports shoulder is about the same that it was just here yesterday.              Objective   See Exercise, Manual, and Modality Logs for complete treatment.   Tenderness left infraspinatus muscle belly      Assessment/Plan  Patient presents with improving tolerance for endurance and strengthening.  Minimal pain with range of motion or stretching.  Continues with weakness with prolonged activity.       Progress strengthening /stabilization /functional activity  Assess KT T tape         Timed:  Manual Therapy:    -     mins  34908;  Therapeutic Exercise:    15     mins  84327;     Neuromuscular Angel:    8    mins  22591;    Therapeutic Activity:     10     mins  39262;   Gait Training:                 -     mins  83268;   Self Care                       -     mins   11595;  Orthotic fit/train              -     mins  89402;   Dry Needling Tri          __-__ DNTRIAL;   Ultrasound:     8     mins  80405;  Ionto                          -     mins 39824 ;  Self Pay 15                  -       mins   [email or web address]  Self Pay 30                  -      mins  [email or web address]      Untimed:  Electrical Stimulation:    -     mins  26109 ( );  Mechanical Traction:    -     mins  10169;   Dry Needling:              __-_ mins 98785, 19068  Paraffin       -  minutes 43084     Timed Treatment:   41   mins   Total Treatment:     50   mins  Alia Johnson, PT, CIDN  Physical Therapist

## 2024-10-18 ENCOUNTER — TREATMENT (OUTPATIENT)
Age: 59
End: 2024-10-18
Payer: COMMERCIAL

## 2024-10-18 DIAGNOSIS — S46.912D MUSCLE STRAIN OF LEFT SCAPULAR REGION, SUBSEQUENT ENCOUNTER: Primary | ICD-10-CM

## 2024-10-18 DIAGNOSIS — S46.812D STRAIN OF OTHER MUSCLES, FASCIA AND TENDONS AT SHOULDER AND UPPER ARM LEVEL, LEFT ARM, SUBSEQUENT ENCOUNTER: ICD-10-CM

## 2024-10-18 NOTE — LETTER
Physical Therapy Daily Progress Note      Patient: Elodia Alicea   : 1965  Referring practitioner: HECTOR Martinez  Date of Initial Visit: Type: THERAPY  Noted: 10/9/2024  Diagnosis/ICD-10 Code:  Muscle strain of left scapular region, subsequent encounter [S46.912D]  Today's Date: 10/18/2024  Patient seen for 5 sessions         Elodia Alicea reports: No significant pain in the shoulder and I am using it at home normally does not lift anything heavy.              Objective   See Exercise, Manual, and Modality Logs for complete treatment.     Palpation   Left   Tenderness of the infraspinatus.      Tenderness      Left Shoulder   Very mild tenderness in the infraspinatus tendon      Cervical/Thoracic Screen   Cervical range of motion within normal limits     Neurological Testing      Sensation      Shoulder   Left Shoulder   Intact: light touch     Right Shoulder   Intact: Light touch     Active Range of Motion   Left Shoulder   Flexion: WFL  Abduction: Left shoulder active abduction: full, no pain  External rotation 90°: 90 degrees    Internal rotation BTB: T12       Right Shoulder   Flexion: WFL  External rotation 90°: 90 degrees  Internal rotation BTB: T10      Passive Range of Motion   Left Shoulder   Normal passive range of motion  Abduction: with pain  External rotation 90°: full  Internal rotation 90°: full     Joint Play   Left Shoulder  Joints within functional limits are the anterior capsule, posterior capsule and inferior capsule.      Strength/Myotome Testing      Left Shoulder      Planes of Motion   Flexion: 5   Abduction: 5   External rotation at 0°: 4+, 90 4/5   Internal rotation at 0°: 5     Isolated Muscles   Infraspinatus: 4+  Supraspinatus: 5      Tests      Left Shoulder   Negative: painful arc., lift off  Negative anterior load and shift, AC shear, apprehension, drop arm, empty can, full can, Hawkin's, Neer's, posterior load and shift and sulcus sign.      Functional Assessment  "  Lifting: floor to knee to waist - 25lbs box  with no pain  Waist to shoulder - 20lbs with box with no pain  Pulling cart simulation: 17lbs without pain     Patient education: Ergonomics, shoulder biomechanics, importance of continued strengthening for prevention, postural stretching throughout the day, and use of ice as needed when returning to regular activities.     \"Parts of this note may be an electronic transcription/translation of spoken language to printed text using the Dragon dictation system    Assessment/Plan  Patient presents with full left shoulder range of motion ROM without pain.  Special testing is normal for the shoulder and scapula, she has slight weakness but no pain with external rotation overhead.  This is similar to patient's baseline and the right shoulder.  We discussed in detail ergonomics, biomechanics, and protection of shoulder and spine with activities.  She has been implementing this into her daily activities with no significant pain remaining in the shoulder.  During his job simulation exercises today she was able to lift up to 25 pounds from the floor to her waist and 20 pounds to shoulder level without pain.  She is not required to lift anything heavy overhead at work.  She feels that she is ready to return to work and that she can do her job normally.    To MD next week             Timed Treatment:   50   mins   Total Treatment:     55   mins  Aila Johnson PT, KATHLEEN  Physical Therapist                      "

## 2024-10-18 NOTE — PROGRESS NOTES
Physical Therapy Daily Progress Note      Patient: Elodia Alicea   : 1965  Referring practitioner: HECTOR Martinez  Date of Initial Visit: Type: THERAPY  Noted: 10/9/2024  Diagnosis/ICD-10 Code:  Muscle strain of left scapular region, subsequent encounter [S46.912D]  Today's Date: 10/18/2024  Patient seen for 5 sessions         Elodia Alicea reports: No significant pain in the shoulder and I am using it at home normally does not lift anything heavy.              Objective   See Exercise, Manual, and Modality Logs for complete treatment.     Palpation   Left   Tenderness of the infraspinatus.      Tenderness      Left Shoulder   Very mild tenderness in the infraspinatus tendon      Cervical/Thoracic Screen   Cervical range of motion within normal limits     Neurological Testing      Sensation      Shoulder   Left Shoulder   Intact: light touch     Right Shoulder   Intact: Light touch     Active Range of Motion   Left Shoulder   Flexion: WFL  Abduction: Left shoulder active abduction: full, no pain  External rotation 90°: 90 degrees    Internal rotation BTB: T12       Right Shoulder   Flexion: WFL  External rotation 90°: 90 degrees  Internal rotation BTB: T10      Passive Range of Motion   Left Shoulder   Normal passive range of motion  Abduction: with pain  External rotation 90°: full  Internal rotation 90°: full     Joint Play   Left Shoulder  Joints within functional limits are the anterior capsule, posterior capsule and inferior capsule.      Strength/Myotome Testing      Left Shoulder      Planes of Motion   Flexion: 5   Abduction: 5   External rotation at 0°: 4+, 90 4/5   Internal rotation at 0°: 5     Isolated Muscles   Infraspinatus: 4+  Supraspinatus: 5      Tests      Left Shoulder   Negative: painful arc., lift off  Negative anterior load and shift, AC shear, apprehension, drop arm, empty can, full can, Hawkin's, Neer's, posterior load and shift and sulcus sign.      Functional Assessment  "  Lifting: floor to knee to waist - 25lbs box  with no pain  Waist to shoulder - 20lbs with box with no pain  Pulling cart simulation: 17lbs without pain     Patient education: Ergonomics, shoulder biomechanics, importance of continued strengthening for prevention, postural stretching throughout the day, and use of ice as needed when returning to regular activities.     \"Parts of this note may be an electronic transcription/translation of spoken language to printed text using the Dragon dictation system    Assessment/Plan  Patient presents with full left shoulder range of motion ROM without pain.  Special testing is normal for the shoulder and scapula, she has slight weakness but no pain with external rotation overhead.  This is similar to patient's baseline and the right shoulder.  We discussed in detail ergonomics, biomechanics, and protection of shoulder and spine with activities.  She has been implementing this into her daily activities with no significant pain remaining in the shoulder.  During his job simulation exercises today she was able to lift up to 25 pounds from the floor to her waist and 20 pounds to shoulder level without pain.  She is not required to lift anything heavy overhead at work.  She feels that she is ready to return to work and that she can do her job normally.    Awaiting MD orders           Timed:  Manual Therapy:    -     mins  02905;  Therapeutic Exercise:    10     mins  35601;     Neuromuscular Angel:    -    mins  79617;    Therapeutic Activity:     15     mins  53376;   Gait Training:                 -     mins  96823;   Self Care                       9     mins   68593;  Orthotic fit/train              -     mins  32271;   Dry Needling Tri          __--__ DNTRIAL;   Ultrasound:     8     mins  30093;  Ionto                          8     mins 56762 ;  Self Pay 15                  -       mins   [email or web address]  Self Pay 30                  -      mins  PTSPMIN2      Untimed:  Electrical " Stimulation:    -     mins  01635 ( );  Mechanical Traction:    -     mins  77352;   Dry Needling:              __-_ mins 20561, 20560    Timed Treatment:   50   mins   Total Treatment:     55   mins  Alia Johnson PT, CIDN  Physical Therapist

## 2024-12-11 ENCOUNTER — OFFICE VISIT (OUTPATIENT)
Dept: FAMILY MEDICINE CLINIC | Facility: CLINIC | Age: 59
End: 2024-12-11
Payer: COMMERCIAL

## 2024-12-11 ENCOUNTER — LAB (OUTPATIENT)
Dept: FAMILY MEDICINE CLINIC | Facility: CLINIC | Age: 59
End: 2024-12-11
Payer: COMMERCIAL

## 2024-12-11 VITALS
OXYGEN SATURATION: 97 % | DIASTOLIC BLOOD PRESSURE: 89 MMHG | HEIGHT: 67 IN | HEART RATE: 82 BPM | BODY MASS INDEX: 31.71 KG/M2 | WEIGHT: 202 LBS | SYSTOLIC BLOOD PRESSURE: 136 MMHG

## 2024-12-11 DIAGNOSIS — Z00.01 ENCOUNTER FOR GENERAL ADULT MEDICAL EXAMINATION WITH ABNORMAL FINDINGS: ICD-10-CM

## 2024-12-11 DIAGNOSIS — I10 PRIMARY HYPERTENSION: ICD-10-CM

## 2024-12-11 DIAGNOSIS — Z12.31 ENCOUNTER FOR SCREENING MAMMOGRAM FOR MALIGNANT NEOPLASM OF BREAST: ICD-10-CM

## 2024-12-11 DIAGNOSIS — E66.9 OBESITY (BMI 30-39.9): ICD-10-CM

## 2024-12-11 DIAGNOSIS — Z00.01 ENCOUNTER FOR GENERAL ADULT MEDICAL EXAMINATION WITH ABNORMAL FINDINGS: Primary | ICD-10-CM

## 2024-12-11 LAB
ALBUMIN SERPL-MCNC: 4.3 G/DL (ref 3.5–5.2)
ALBUMIN/GLOB SERPL: 1.4 G/DL
ALP SERPL-CCNC: 83 U/L (ref 39–117)
ALT SERPL W P-5'-P-CCNC: 29 U/L (ref 1–33)
ANION GAP SERPL CALCULATED.3IONS-SCNC: 10.6 MMOL/L (ref 5–15)
AST SERPL-CCNC: 24 U/L (ref 1–32)
BILIRUB SERPL-MCNC: 0.3 MG/DL (ref 0–1.2)
BUN SERPL-MCNC: 14 MG/DL (ref 8–23)
BUN/CREAT SERPL: 19.7 (ref 7–25)
CALCIUM SPEC-SCNC: 9.5 MG/DL (ref 8.6–10.5)
CHLORIDE SERPL-SCNC: 102 MMOL/L (ref 98–107)
CHOLEST SERPL-MCNC: 152 MG/DL (ref 0–200)
CO2 SERPL-SCNC: 25.4 MMOL/L (ref 22–29)
CREAT SERPL-MCNC: 0.71 MG/DL (ref 0.57–1)
EGFRCR SERPLBLD CKD-EPI 2021: 97.5 ML/MIN/1.73
GLOBULIN UR ELPH-MCNC: 3 GM/DL
GLUCOSE SERPL-MCNC: 83 MG/DL (ref 65–99)
HBA1C MFR BLD: 5.5 % (ref 4.8–5.6)
HDLC SERPL-MCNC: 42 MG/DL (ref 40–60)
LDLC SERPL CALC-MCNC: 85 MG/DL (ref 0–100)
LDLC/HDLC SERPL: 1.93 {RATIO}
POTASSIUM SERPL-SCNC: 4 MMOL/L (ref 3.5–5.2)
PROT SERPL-MCNC: 7.3 G/DL (ref 6–8.5)
SODIUM SERPL-SCNC: 138 MMOL/L (ref 136–145)
TRIGL SERPL-MCNC: 144 MG/DL (ref 0–150)
TSH SERPL DL<=0.05 MIU/L-ACNC: 0.61 UIU/ML (ref 0.27–4.2)
VLDLC SERPL-MCNC: 25 MG/DL (ref 5–40)

## 2024-12-11 PROCEDURE — 84443 ASSAY THYROID STIM HORMONE: CPT | Performed by: FAMILY MEDICINE

## 2024-12-11 PROCEDURE — 99396 PREV VISIT EST AGE 40-64: CPT | Performed by: FAMILY MEDICINE

## 2024-12-11 PROCEDURE — 80061 LIPID PANEL: CPT | Performed by: FAMILY MEDICINE

## 2024-12-11 PROCEDURE — 36415 COLL VENOUS BLD VENIPUNCTURE: CPT

## 2024-12-11 PROCEDURE — 83036 HEMOGLOBIN GLYCOSYLATED A1C: CPT | Performed by: FAMILY MEDICINE

## 2024-12-11 PROCEDURE — 80053 COMPREHEN METABOLIC PANEL: CPT | Performed by: FAMILY MEDICINE

## 2024-12-11 RX ORDER — EPINEPHRINE 0.3 MG/.3ML
0.3 INJECTION SUBCUTANEOUS ONCE
Qty: 2 EACH | Refills: 11 | Status: SHIPPED | OUTPATIENT
Start: 2024-12-11 | End: 2024-12-13

## 2024-12-11 RX ORDER — LISINOPRIL 20 MG/1
30 TABLET ORAL DAILY
Qty: 135 TABLET | Refills: 3 | Status: SHIPPED | OUTPATIENT
Start: 2024-12-11

## 2024-12-11 NOTE — PROGRESS NOTES
Subjective   Elodia Alicea is a 59 y.o. female.     History of Present Illness  The patient presents for a physical exam.    She is current with her Pap smear but has not undergone a mammogram in several years. She is up to date on her colonoscopy. She reports no specific concerns at this time. She does not experience any unusual headaches, chest pain, or increased shortness of breath. She also reports no abdominal pain, and her bowel movements and urination are normal. Her emotional state is stable. She has not resumed smoking. She maintains a healthy diet, including salads, and acknowledges the need for increased physical activity. She has received her influenza vaccine this year.    She expresses concern regarding her blood pressure, which has consistently been elevated during this year. She is currently on a regimen of lisinopril 20 mg and is considering an increase to 30 mg.    She has a known allergy to PINE NUTS and carries an EpiPen. However, she mentions that the cost of refilling the EpiPen is $ 400. She has a few  EpiPens at home. She recalls an incident where she required 3 EpiPen injections due to an allergic reaction to a cracker containing sunflower seed oil.    Supplemental Information  She broke her ankle earlier this year while taking out the garbage.    SOCIAL HISTORY  She has not resumed smoking.    ALLERGIES  She is allergic to PINE NUTS.    MEDICATIONS  Current: Lisinopril, EpiPen    IMMUNIZATIONS  She has received her influenza vaccine this year.       The following portions of the patient's history were reviewed and updated as appropriate: allergies, current medications, past family history, past medical history, past social history, past surgical history, and problem list.  Past Medical History:   Diagnosis Date    Allergic     Anxiety     Hyperlipidemia     Hypertension      Past Surgical History:   Procedure Laterality Date    BREAST SURGERY       SECTION      REDUCTION  "MAMMAPLASTY       Family History   Problem Relation Age of Onset    Diabetes Mother     Hypertension Mother     Diabetes Father     Hypertension Father     Hyperlipidemia Father      Social History     Socioeconomic History    Marital status:    Tobacco Use    Smoking status: Former     Current packs/day: 0.00     Average packs/day: 0.5 packs/day for 10.0 years (5.0 ttl pk-yrs)     Types: Cigarettes     Start date: 3/31/2010     Quit date: 3/31/2020     Years since quittin.7    Smokeless tobacco: Former   Vaping Use    Vaping status: Never Used   Substance and Sexual Activity    Alcohol use: Not Currently     Comment: once every six month     Drug use: Not Currently    Sexual activity: Defer         Current Outpatient Medications:     atorvastatin (LIPITOR) 20 MG tablet, Take 1 tablet by mouth Daily., Disp: 90 tablet, Rfl: 0    citalopram (CeleXA) 10 MG tablet, Take 1 tablet by mouth Daily., Disp: 90 tablet, Rfl: 0    EPINEPHrine (EPIPEN) 0.3 MG/0.3ML solution auto-injector injection, Inject 0.3 mL into the appropriate muscle as directed by prescriber 1 (One) Time for 1 dose., Disp: 1 each, Rfl: 11    lisinopril (PRINIVIL,ZESTRIL) 20 MG tablet, Take 1.5 tablets by mouth Daily. For bp, Disp: 135 tablet, Rfl: 3    meloxicam (MOBIC) 15 MG tablet, Take 1 tablet by mouth Daily., Disp: 90 tablet, Rfl: 1    zolpidem (AMBIEN) 10 MG tablet, Take 1 tablet by mouth At Night As Needed for Sleep., Disp: 30 tablet, Rfl: 3    Review of Systems  ROS done and noted in HPI    /89 (BP Location: Left arm, Patient Position: Sitting, Cuff Size: Large Adult)   Pulse 82   Ht 168.9 cm (66.5\")   Wt 91.6 kg (202 lb)   SpO2 97%   BMI 32.12 kg/m²           Objective   Physical Exam  Vitals and nursing note reviewed.   Constitutional:       Appearance: Normal appearance. She is well-developed and well-groomed. She is obese.   HENT:      Head: Normocephalic and atraumatic.      Right Ear: Tympanic membrane, ear canal and " external ear normal.      Left Ear: Tympanic membrane, ear canal and external ear normal.      Nose: Nose normal.      Mouth/Throat:      Mouth: Mucous membranes are moist.      Pharynx: Oropharynx is clear.   Eyes:      Extraocular Movements: Extraocular movements intact.      Conjunctiva/sclera: Conjunctivae normal.      Pupils: Pupils are equal, round, and reactive to light.   Neck:      Thyroid: No thyromegaly.      Vascular: No carotid bruit.   Cardiovascular:      Rate and Rhythm: Normal rate and regular rhythm.      Pulses: Normal pulses.      Heart sounds: Normal heart sounds.   Pulmonary:      Effort: Pulmonary effort is normal.      Breath sounds: Normal breath sounds.   Abdominal:      General: Abdomen is flat. Bowel sounds are normal.      Palpations: Abdomen is soft. There is no hepatomegaly, splenomegaly or mass.      Tenderness: There is no abdominal tenderness.      Hernia: No hernia is present.   Musculoskeletal:      Cervical back: Normal range of motion and neck supple.      Right lower leg: No edema.      Left lower leg: No edema.   Lymphadenopathy:      Cervical: No cervical adenopathy.      Upper Body:      Right upper body: No supraclavicular adenopathy.      Left upper body: No supraclavicular adenopathy.   Skin:     General: Skin is warm and dry.      Findings: No lesion or rash.   Neurological:      General: No focal deficit present.      Mental Status: She is alert.      Motor: Motor function is intact.      Deep Tendon Reflexes: Reflexes are normal and symmetric.   Psychiatric:         Attention and Perception: Attention normal.         Mood and Affect: Mood normal.         Behavior: Behavior is cooperative.       Physical Exam  Lungs were auscultated.       Results         Assessment & Plan   Problems Addressed this Visit          Cardiac and Vasculature    Hypertension    Relevant Medications    lisinopril (PRINIVIL,ZESTRIL) 20 MG tablet    EPINEPHrine (EPIPEN) 0.3 MG/0.3ML solution  auto-injector injection       Endocrine and Metabolic    Obesity (BMI 30-39.9)       Health Encounters    Encounter for general adult medical examination with abnormal findings - Primary    Relevant Orders    Comprehensive Metabolic Panel    Lipid Panel    Hemoglobin A1c    TSH     Other Visit Diagnoses       Encounter for screening mammogram for malignant neoplasm of breast        Relevant Orders    Mammo Screening Digital Tomosynthesis Bilateral With CAD          Diagnoses         Codes Comments    Encounter for general adult medical examination with abnormal findings    -  Primary ICD-10-CM: Z00.01  ICD-9-CM: V70.0     Obesity (BMI 30-39.9)     ICD-10-CM: E66.9  ICD-9-CM: 278.00     Encounter for screening mammogram for malignant neoplasm of breast     ICD-10-CM: Z12.31  ICD-9-CM: V76.12     Primary hypertension     ICD-10-CM: I10  ICD-9-CM: 401.9           Assessment & Plan  1. Health maintenance.  She is current with her Pap smear and colonoscopy screenings. A mammogram has been ordered as she is due for this screening.  CMP, lipid, A1c, TSH ordered    2. Elevated blood pressure.  . She has been advised to monitor her blood pressure closely and implement lifestyle modifications such as weight loss, salt reduction, and caffeine limitation. The dosage of lisinopril will be increased from 20 mg to 30 mg. A new prescription for lisinopril has been provided. She has been instructed to report any symptoms of lightheadedness or dizziness upon standing.    3. Food allergy.  She has a known allergy to PINE NUTS and requires an EpiPen. She has been advised against using  EpiPens. A prescription for an EpiPen has been sent to [facility]. If the cost remains high, she has been advised to consider using GoodRx for potential savings.            Patient or patient representative verbalized consent for the use of Ambient Listening during the visit with  Heena Villagomez MD for chart documentation.  12/11/2024  08:54 EST

## 2024-12-16 DIAGNOSIS — G47.09 OTHER INSOMNIA: ICD-10-CM

## 2024-12-17 RX ORDER — ZOLPIDEM TARTRATE 10 MG/1
10 TABLET ORAL NIGHTLY PRN
Qty: 30 TABLET | Refills: 3 | Status: SHIPPED | OUTPATIENT
Start: 2024-12-17

## 2025-01-03 ENCOUNTER — HOSPITAL ENCOUNTER (OUTPATIENT)
Dept: MAMMOGRAPHY | Facility: HOSPITAL | Age: 60
Discharge: HOME | End: 2025-01-03
Admitting: FAMILY MEDICINE
Payer: COMMERCIAL

## 2025-01-03 DIAGNOSIS — Z12.31 ENCOUNTER FOR SCREENING MAMMOGRAM FOR MALIGNANT NEOPLASM OF BREAST: ICD-10-CM

## 2025-01-03 PROCEDURE — 77067 SCR MAMMO BI INCL CAD: CPT

## 2025-01-03 PROCEDURE — 77063 BREAST TOMOSYNTHESIS BI: CPT

## 2025-01-16 RX ORDER — ATORVASTATIN CALCIUM 20 MG/1
20 TABLET, FILM COATED ORAL DAILY
Qty: 90 TABLET | Refills: 0 | Status: CANCELLED | OUTPATIENT
Start: 2025-01-16

## 2025-01-16 RX ORDER — CITALOPRAM HYDROBROMIDE 10 MG/1
10 TABLET ORAL DAILY
Qty: 90 TABLET | Refills: 0 | Status: CANCELLED | OUTPATIENT
Start: 2025-01-16

## 2025-01-17 RX ORDER — ATORVASTATIN CALCIUM 20 MG/1
20 TABLET, FILM COATED ORAL DAILY
Qty: 90 TABLET | Refills: 0 | Status: SHIPPED | OUTPATIENT
Start: 2025-01-17

## 2025-01-17 RX ORDER — CITALOPRAM HYDROBROMIDE 10 MG/1
10 TABLET ORAL DAILY
Qty: 90 TABLET | Refills: 0 | Status: SHIPPED | OUTPATIENT
Start: 2025-01-17

## 2025-04-09 DIAGNOSIS — G47.09 OTHER INSOMNIA: ICD-10-CM

## 2025-04-09 RX ORDER — ZOLPIDEM TARTRATE 10 MG/1
10 TABLET ORAL NIGHTLY PRN
Qty: 30 TABLET | Refills: 3 | Status: SHIPPED | OUTPATIENT
Start: 2025-04-09

## 2025-04-15 RX ORDER — CITALOPRAM HYDROBROMIDE 10 MG/1
10 TABLET ORAL DAILY
Qty: 90 TABLET | Refills: 0 | Status: SHIPPED | OUTPATIENT
Start: 2025-04-15

## 2025-04-15 RX ORDER — ATORVASTATIN CALCIUM 20 MG/1
20 TABLET, FILM COATED ORAL DAILY
Qty: 90 TABLET | Refills: 0 | Status: SHIPPED | OUTPATIENT
Start: 2025-04-15

## 2025-07-24 RX ORDER — ATORVASTATIN CALCIUM 20 MG/1
20 TABLET, FILM COATED ORAL DAILY
Qty: 90 TABLET | Refills: 0 | Status: SHIPPED | OUTPATIENT
Start: 2025-07-24

## 2025-07-24 RX ORDER — CITALOPRAM HYDROBROMIDE 10 MG/1
10 TABLET ORAL DAILY
Qty: 90 TABLET | Refills: 0 | Status: SHIPPED | OUTPATIENT
Start: 2025-07-24

## 2025-08-03 DIAGNOSIS — G47.09 OTHER INSOMNIA: ICD-10-CM

## 2025-08-04 RX ORDER — ZOLPIDEM TARTRATE 10 MG/1
10 TABLET ORAL NIGHTLY PRN
Qty: 30 TABLET | Refills: 3 | Status: SHIPPED | OUTPATIENT
Start: 2025-08-04

## 2025-08-13 ENCOUNTER — OFFICE VISIT (OUTPATIENT)
Dept: FAMILY MEDICINE CLINIC | Facility: CLINIC | Age: 60
End: 2025-08-13
Payer: COMMERCIAL

## 2025-08-13 VITALS
OXYGEN SATURATION: 97 % | SYSTOLIC BLOOD PRESSURE: 136 MMHG | HEART RATE: 100 BPM | BODY MASS INDEX: 31.8 KG/M2 | WEIGHT: 200 LBS | TEMPERATURE: 98 F | DIASTOLIC BLOOD PRESSURE: 90 MMHG

## 2025-08-13 DIAGNOSIS — M54.50 ACUTE LEFT-SIDED LOW BACK PAIN WITHOUT SCIATICA: Primary | ICD-10-CM

## 2025-08-13 PROCEDURE — 99214 OFFICE O/P EST MOD 30 MIN: CPT | Performed by: NURSE PRACTITIONER

## 2025-08-13 RX ORDER — MELOXICAM 15 MG/1
15 TABLET ORAL DAILY
Qty: 90 TABLET | Refills: 1 | Status: SHIPPED | OUTPATIENT
Start: 2025-08-13

## 2025-08-13 RX ORDER — TIZANIDINE 2 MG/1
2 TABLET ORAL NIGHTLY PRN
Qty: 20 TABLET | Refills: 0 | Status: SHIPPED | OUTPATIENT
Start: 2025-08-13

## 2025-08-16 ENCOUNTER — HOSPITAL ENCOUNTER (OUTPATIENT)
Dept: GENERAL RADIOLOGY | Facility: HOSPITAL | Age: 60
Discharge: HOME | End: 2025-08-16
Payer: COMMERCIAL

## 2025-08-16 DIAGNOSIS — M54.50 ACUTE LEFT-SIDED LOW BACK PAIN WITHOUT SCIATICA: ICD-10-CM

## 2025-08-16 PROCEDURE — 72100 X-RAY EXAM L-S SPINE 2/3 VWS: CPT

## 2025-08-19 ENCOUNTER — TELEPHONE (OUTPATIENT)
Dept: FAMILY MEDICINE CLINIC | Facility: CLINIC | Age: 60
End: 2025-08-19
Payer: COMMERCIAL